# Patient Record
Sex: MALE | Race: WHITE | NOT HISPANIC OR LATINO | Employment: OTHER | URBAN - METROPOLITAN AREA
[De-identification: names, ages, dates, MRNs, and addresses within clinical notes are randomized per-mention and may not be internally consistent; named-entity substitution may affect disease eponyms.]

---

## 2017-03-27 ENCOUNTER — TELEPHONE (OUTPATIENT)
Dept: UROLOGY | Facility: CLINIC | Age: 82
End: 2017-03-27

## 2017-03-27 NOTE — TELEPHONE ENCOUNTER
----- Message from Serena Davidson sent at 3/24/2017 11:58 AM CDT -----  Artee with the [facility] called.   No. [phone]  Ext. [phone]   New patient has an elevated PSA.  She would like to make an appointment on 3/31/17 around 1:00.   She is coming to the office on that date and time with another patient.   Please call.

## 2017-03-31 ENCOUNTER — OFFICE VISIT (OUTPATIENT)
Dept: UROLOGY | Facility: CLINIC | Age: 82
End: 2017-03-31
Payer: MEDICARE

## 2017-03-31 VITALS
DIASTOLIC BLOOD PRESSURE: 80 MMHG | BODY MASS INDEX: 24.44 KG/M2 | TEMPERATURE: 98 F | SYSTOLIC BLOOD PRESSURE: 130 MMHG | HEART RATE: 100 BPM | WEIGHT: 184.44 LBS | RESPIRATION RATE: 20 BRPM | HEIGHT: 73 IN

## 2017-03-31 DIAGNOSIS — R97.20 ELEVATED PSA, LESS THAN 10 NG/ML: Primary | ICD-10-CM

## 2017-03-31 DIAGNOSIS — N40.1 BENIGN NON-NODULAR PROSTATIC HYPERPLASIA WITH LOWER URINARY TRACT SYMPTOMS: ICD-10-CM

## 2017-03-31 DIAGNOSIS — R35.0 URINARY FREQUENCY: ICD-10-CM

## 2017-03-31 DIAGNOSIS — R35.1 NOCTURIA: ICD-10-CM

## 2017-03-31 DIAGNOSIS — G20.A1 PARKINSON DISEASE: Chronic | ICD-10-CM

## 2017-03-31 PROBLEM — N40.0 BPH WITHOUT OBSTRUCTION/LOWER URINARY TRACT SYMPTOMS: Status: ACTIVE | Noted: 2017-03-31

## 2017-03-31 PROCEDURE — 99499 UNLISTED E&M SERVICE: CPT | Mod: S$GLB,,, | Performed by: UROLOGY

## 2017-03-31 PROCEDURE — 1126F AMNT PAIN NOTED NONE PRSNT: CPT | Mod: S$GLB,,, | Performed by: UROLOGY

## 2017-03-31 PROCEDURE — 1157F ADVNC CARE PLAN IN RCRD: CPT | Mod: S$GLB,,, | Performed by: UROLOGY

## 2017-03-31 PROCEDURE — 1160F RVW MEDS BY RX/DR IN RCRD: CPT | Mod: S$GLB,,, | Performed by: UROLOGY

## 2017-03-31 PROCEDURE — 1159F MED LIST DOCD IN RCRD: CPT | Mod: S$GLB,,, | Performed by: UROLOGY

## 2017-03-31 PROCEDURE — 99999 PR PBB SHADOW E&M-EST. PATIENT-LVL III: CPT | Mod: PBBFAC,,, | Performed by: UROLOGY

## 2017-03-31 PROCEDURE — 99204 OFFICE O/P NEW MOD 45 MIN: CPT | Mod: S$GLB,,, | Performed by: UROLOGY

## 2017-03-31 RX ORDER — CLOPIDOGREL BISULFATE 75 MG/1
75 TABLET ORAL DAILY
COMMUNITY

## 2017-03-31 RX ORDER — DOCUSATE SODIUM 100 MG/1
100 CAPSULE, LIQUID FILLED ORAL NIGHTLY PRN
COMMUNITY

## 2017-03-31 RX ORDER — METOPROLOL TARTRATE 25 MG/1
75 TABLET, FILM COATED ORAL 2 TIMES DAILY
COMMUNITY

## 2017-03-31 RX ORDER — RISPERIDONE 0.5 MG/1
0.5 TABLET ORAL 2 TIMES DAILY
COMMUNITY

## 2017-03-31 RX ORDER — LORAZEPAM 0.5 MG/1
0.5 TABLET ORAL EVERY 6 HOURS PRN
COMMUNITY
End: 2017-07-25

## 2017-03-31 RX ORDER — CARBIDOPA AND LEVODOPA 50; 200 MG/1; MG/1
1 TABLET, EXTENDED RELEASE ORAL 3 TIMES DAILY
COMMUNITY

## 2017-03-31 NOTE — PROGRESS NOTES
Subjective:       Patient ID: Eliecer Dickson is a 89 y.o. male.    Chief Complaint: Elevated PSA    HPI Comments: 89 year old WM from [facility] in [place], [place]. Pt developed an elevation of PSA (6.8). BPH with LUTS.    Benign Prostatic Hypertrophy   This is a chronic problem. The current episode started more than 1 year ago. The problem has been gradually improving since onset. Irritative symptoms include frequency (q 2 hr) and nocturia ( x 3). Irritative symptoms do not include urgency. Obstructive symptoms do not include dribbling, incomplete emptying, an intermittent stream, a slower stream, straining or a weak stream. Pertinent negatives include no chills, dysuria, genital pain, hematuria, hesitancy, nausea or vomiting. AUA score is 0-7. He is not sexually active. The symptoms are aggravated by caffeine. Past treatments include finasteride. The treatment provided moderate relief. He has been using treatment for 2 or more years.     Review of Systems   Constitutional: Negative for activity change, appetite change, chills, diaphoresis, fatigue, fever and unexpected weight change.   HENT: Negative for congestion, hearing loss, sinus pressure and trouble swallowing.    Eyes: Negative for photophobia, pain, discharge and visual disturbance.   Respiratory: Negative for apnea, cough and shortness of breath.    Cardiovascular: Negative for chest pain, palpitations and leg swelling.   Gastrointestinal: Negative for abdominal distention, abdominal pain, anal bleeding, blood in stool, constipation, diarrhea, nausea, rectal pain and vomiting.   Endocrine: Negative for cold intolerance, heat intolerance, polydipsia, polyphagia and polyuria.   Genitourinary: Positive for frequency (q 2 hr) and nocturia ( x 3). Negative for decreased urine volume, difficulty urinating, discharge, dysuria, enuresis, flank pain, genital sores, hematuria, hesitancy, incomplete emptying, penile pain, penile swelling, scrotal swelling, testicular pain  and urgency.   Musculoskeletal: Negative for arthralgias, back pain and myalgias.   Skin: Negative for color change, pallor, rash and wound.   Allergic/Immunologic: Negative for environmental allergies, food allergies and immunocompromised state.   Neurological: Negative for dizziness, seizures, weakness and headaches.   Hematological: Negative for adenopathy. Does not bruise/bleed easily.   Psychiatric/Behavioral: Negative.        Objective:      Physical Exam   Nursing note and vitals reviewed.  Constitutional: He is oriented to person, place, and time. He appears well-developed and well-nourished.   HENT:   Head: Normocephalic.   Nose: Nose normal.   Mouth/Throat: Oropharynx is clear and moist.   Eyes: Conjunctivae and EOM are normal. Pupils are equal, round, and reactive to light.   Neck: Normal range of motion. Neck supple.   Cardiovascular: Normal rate, regular rhythm, normal heart sounds and intact distal pulses.    Pulmonary/Chest: Effort normal and breath sounds normal.   Abdominal: Soft. Bowel sounds are normal.   Genitourinary: Rectum normal, testes normal and penis normal. Prostate is enlarged (~ 50 grams, benign). Prostate is not tender. Cremasteric reflex is present. Circumcised.   Musculoskeletal: Normal range of motion.   Neurological: He is alert and oriented to person, place, and time. He has normal reflexes.   Skin: Skin is warm and dry.     Psychiatric: He has a normal mood and affect. His behavior is normal. Judgment and thought content normal.       Assessment:       1. Parkinson disease    2. Benign non-nodular prostatic hyperplasia with lower urinary tract symptoms    3. Nocturia    4. Urinary frequency        Plan:       Patient Instructions   Check Free and total PSA now.  If elevated will repeat in 6 months.  If PSA stays stable may be normal for this patient based on patients age and size of prostate.

## 2017-03-31 NOTE — MR AVS SNAPSHOT
[facility]  [address] [place] [address] [address]  [place] [address]  Phone: [phone]  Fax: [phone]                  Eliecer Dickson   3/31/2017 11:30 AM   Office Visit    Description:  Male : 1926   Provider:  Murray Perry MD   Department:  [facility]           Reason for Visit     Elevated PSA           Diagnoses this Visit        Comments    Elevated PSA, less than 10 ng/ml    -  Primary     Parkinson disease         Benign non-nodular prostatic hyperplasia with lower urinary tract symptoms         Nocturia         Urinary frequency                To Do List           Future Appointments        Provider Department Dept Phone    2017 10:30 AM Murray Perry MD [facility] [phone]      Goals (5 Years of Data)     None      Follow-Up and Disposition     Return in about 6 months (around 2017) for PSA and MAXIMINO.    Follow-up and Disposition History      [facility]     [facility] Nurse Care Line -  Assistance  Unless otherwise directed by your provider, please contact [facility], our nurse care line that is available for  assistance.     Registered nurses in the [facility] provide: appointment scheduling, clinical advisement, health education, and other advisory services.  Call: [phone] (toll free)               Medications           Message regarding Medications     Verify the changes and/or additions to your medication regime listed below are the same as discussed with your clinician today.  If any of these changes or additions are incorrect, please notify your healthcare provider.        STOP taking these medications     carbidopa-levodopa  mg (SINEMET)  mg per tablet Take 2 tablets by mouth 3 (three) times daily. Pt takes 50mg three times daily    CALCIUM CARB/VIT D3/MINERALS (CALTRATE PLUS ORAL) Take 1 tablet by mouth 2 (two) times daily.    loperamide (IMODIUM) 2 mg capsule Take 4 mg by mouth 2 (two)  times daily as needed.    MULTIVIT-IRON-MIN-FOLIC ACID 3,500-18-0.4 UNIT-MG-MG ORAL CHEW Take 1 tablet by mouth once daily.    phenylephrine-DM-guaifenesin (ROBITUSSIN COUGH & COLD CF) 2.5-5-50 mg/5 mL Liqd Take 30 mLs by mouth 2 (two) times daily.     promethazine (PHENERGAN) 25 MG suppository Place 25 mg rectally every 6 (six) hours as needed.    temazepam (RESTORIL) 30 mg capsule Take 30 mg by mouth nightly as needed.           Verify that the below list of medications is an accurate representation of the medications you are currently taking.  If none reported, the list may be blank. If incorrect, please contact your healthcare provider. Carry this list with you in case of emergency.           Current Medications     alprazolam (XANAX) 0.25 MG tablet Take 0.25 mg by mouth 4 (four) times daily as needed.    aspirin 81 MG Chew Take 81 mg by mouth once daily.    carbidopa-levodopa  mg (SINEMET CR)  mg TbSR Take 1 tablet by mouth 3 (three) times daily.    clopidogrel (PLAVIX) 75 mg tablet Take 75 mg by mouth once daily.    D-METHORPHAN/PE/ACETAMINOPHEN (ROBITUSSIN COLD-FLU DAY ORAL) Take by mouth.    docusate sodium (COLACE) 100 MG capsule Take 100 mg by mouth nightly as needed for Constipation.    donepezil (ARICEPT) 10 MG tablet Take 10 mg by mouth every evening.    finasteride (PROSCAR) 5 mg tablet Take 5 mg by mouth once daily.    FOLIC ACID/MULTIVITS-MIN/LUT (CENTRUM SILVER ORAL) Take by mouth.    furosemide (LASIX) 40 MG tablet Take 1 tablet (40 mg total) by mouth 2 (two) times daily.    isosorbide mononitrate (IMDUR) 30 MG 24 hr tablet Take 1 tablet (30 mg total) by mouth once daily.    lactase (LACTAID) 3,000 unit tablet Take 2 tablets by mouth 2 (two) times daily with meals.    levothyroxine (SYNTHROID) 125 MCG tablet Take 125 mcg by mouth once daily.    loratadine (CLARITIN) 10 mg tablet Take 10 mg by mouth once daily.    lorazepam (ATIVAN) 0.5 MG tablet Take 0.5 mg by mouth every 6 (six) hours  "as needed for Anxiety.    losartan (COZAAR) 50 MG tablet Take 1 tablet (50 mg total) by mouth once daily.    metoprolol tartrate (LOPRESSOR) 25 MG tablet Take 25 mg by mouth 2 (two) times daily. Take 3 tablets for a total of 75 mg twice a day.    potassium chloride SA (K-DUR,KLOR-CON) 20 MEQ tablet Take 20 mEq by mouth once daily.    quetiapine (SEROQUEL) 25 MG Tab Take by mouth.    quetiapine (SEROQUEL) 50 MG tablet Take 50 mg by mouth every evening.    ranitidine (ZANTAC) 150 MG tablet Take 150 mg by mouth 2 (two) times daily.    risperidone (RISPERDAL) 0.5 MG Tab Take 0.5 mg by mouth 2 (two) times daily.    senna-docusate 8.6-50 mg (SENNA WITH DOCUSATE SODIUM) 8.6-50 mg per tablet Take 2 tablets by mouth nightly as needed.    simvastatin (ZOCOR) 40 MG tablet Take 40 mg by mouth every evening.    tamsulosin (FLOMAX) 0.4 mg Cp24 Take 0.4 mg by mouth once daily.    tramadol (ULTRAM) 50 mg tablet Take 50 mg by mouth 2 (two) times daily.    carbidopa (LODOSYN) 25 mg tablet Take 25 mg by mouth 3 (three) times daily. Pt not taking, not on list           Clinical Reference Information           Your Vitals Were     BP Pulse Temp Resp Height Weight    130/80 100 98.3 °F (36.8 °C) 20 6' 1" (1.854 m) 83.7 kg (184 lb 6.6 oz)    BMI                24.33 kg/m2          Blood Pressure          Most Recent Value    BP  130/80      Allergies as of 3/31/2017     No Known Allergies      Immunizations Administered on Date of Encounter - 3/31/2017     None      Orders Placed During Today's Visit     Future Labs/Procedures Expected by Expires    PSA, total and free  3/31/2017 5/30/2018      [facility] Sign-Up     Activating your [facility] account is as easy as 1-2-3!     1) Visit [email or web address], select Sign Up Now, enter this activation code and your date of birth, then select Next.  [email or web address]  Expires: 5/15/2017  3:00 PM      2) Create a username and password to use when you visit [facility] in the future and select a security " question in case you lose your password and select Next.    3) Enter your e-mail address and click Sign Up!    Additional Information  If you have questions, please e-mail [email or web address] or call [phone] to talk to our [facility] staff. Remember, [facility] is NOT to be used for urgent needs. For medical emergencies, dial 911.         Instructions    Check Free and total PSA now.  If elevated will repeat in 6 months.  If PSA stays stable may be normal for this patient based on patients age and size of prostate.       Language Assistance Services     ATTENTION: Language assistance services are available, free of charge. Please call [phone].      ATENCIÓN: Si ron linares, tiene a parkinson disposición servicios gratuitos de asistencia lingüística. Llame al [phone].     CHÚ Ý: N?u b?n nói Ti?ng Vi?t, có các d?ch v? h? tr? ngôn ng? mi?n phí dành cho b?n. G?i s? [phone].         [facility] complies with applicable Federal civil rights laws and does not discriminate on the basis of race, color, national origin, age, disability, or sex.

## 2017-04-19 PROCEDURE — 99308 SBSQ NF CARE LOW MDM 20: CPT | Mod: ,,, | Performed by: FAMILY MEDICINE

## 2017-05-11 ENCOUNTER — OUTSIDE PLACE OF SERVICE (OUTPATIENT)
Dept: ADMINISTRATIVE | Facility: OTHER | Age: 82
End: 2017-05-11
Payer: MEDICARE

## 2017-05-18 DIAGNOSIS — Z00.00 ENCOUNTER FOR SCREENING AND PREVENTATIVE CARE: Primary | ICD-10-CM

## 2017-05-19 ENCOUNTER — HOSPITAL ENCOUNTER (OUTPATIENT)
Dept: RADIOLOGY | Facility: HOSPITAL | Age: 82
Discharge: HOME | End: 2017-05-19
Attending: FAMILY MEDICINE
Payer: MEDICARE

## 2017-05-19 DIAGNOSIS — Z00.00 ENCOUNTER FOR SCREENING AND PREVENTATIVE CARE: ICD-10-CM

## 2017-05-19 PROCEDURE — 71020 XR CHEST PA AND LATERAL: CPT | Mod: TC,PO

## 2017-05-24 ENCOUNTER — OUTSIDE PLACE OF SERVICE (OUTPATIENT)
Dept: ADMINISTRATIVE | Facility: OTHER | Age: 82
End: 2017-05-24
Payer: MEDICARE

## 2017-05-24 ENCOUNTER — OUTSIDE PLACE OF SERVICE (OUTPATIENT)
Dept: ADMINISTRATIVE | Facility: OTHER | Age: 82
End: 2017-05-24

## 2017-05-24 DIAGNOSIS — R21 RASH: ICD-10-CM

## 2017-05-24 PROCEDURE — 99499 UNLISTED E&M SERVICE: CPT | Mod: ,,, | Performed by: FAMILY MEDICINE

## 2017-06-07 ENCOUNTER — OUTSIDE PLACE OF SERVICE (OUTPATIENT)
Dept: ADMINISTRATIVE | Facility: OTHER | Age: 82
End: 2017-06-07
Payer: MEDICARE

## 2017-06-07 PROCEDURE — 99307 SBSQ NF CARE SF MDM 10: CPT | Mod: ,,, | Performed by: FAMILY MEDICINE

## 2017-07-13 ENCOUNTER — OUTSIDE PLACE OF SERVICE (OUTPATIENT)
Dept: ADMINISTRATIVE | Facility: OTHER | Age: 82
End: 2017-07-13

## 2017-07-19 ENCOUNTER — OUTSIDE PLACE OF SERVICE (OUTPATIENT)
Dept: ADMINISTRATIVE | Facility: OTHER | Age: 82
End: 2017-07-19

## 2017-07-25 ENCOUNTER — HOSPITAL ENCOUNTER (INPATIENT)
Facility: HOSPITAL | Age: 82
LOS: 2 days | Discharge: NURSING, REHAB OR LONG-TERM CARE | DRG: 280 | End: 2017-07-27
Attending: EMERGENCY MEDICINE | Admitting: HOSPITALIST
Payer: MEDICARE

## 2017-07-25 DIAGNOSIS — I50.32 CHRONIC DIASTOLIC CONGESTIVE HEART FAILURE: Chronic | ICD-10-CM

## 2017-07-25 DIAGNOSIS — I45.10 RIGHT BUNDLE BRANCH BLOCK: ICD-10-CM

## 2017-07-25 DIAGNOSIS — I50.33 ACUTE ON CHRONIC DIASTOLIC CONGESTIVE HEART FAILURE: ICD-10-CM

## 2017-07-25 DIAGNOSIS — I21.4 NSTEMI, INITIAL EPISODE OF CARE: ICD-10-CM

## 2017-07-25 DIAGNOSIS — M79.89 LEG SWELLING: ICD-10-CM

## 2017-07-25 DIAGNOSIS — G20.A1 PARKINSON DISEASE: Chronic | ICD-10-CM

## 2017-07-25 DIAGNOSIS — I50.33 ACUTE ON CHRONIC DIASTOLIC HEART FAILURE: ICD-10-CM

## 2017-07-25 DIAGNOSIS — N17.9 ACUTE RENAL FAILURE, UNSPECIFIED ACUTE RENAL FAILURE TYPE: Primary | ICD-10-CM

## 2017-07-25 DIAGNOSIS — I50.20 SYSTOLIC CONGESTIVE HEART FAILURE: ICD-10-CM

## 2017-07-25 DIAGNOSIS — E87.70 HYPERVOLEMIA, UNSPECIFIED HYPERVOLEMIA TYPE: ICD-10-CM

## 2017-07-25 PROBLEM — J96.01 ACUTE HYPOXEMIC RESPIRATORY FAILURE: Status: ACTIVE | Noted: 2017-07-25

## 2017-07-25 PROBLEM — N40.1 BENIGN NON-NODULAR PROSTATIC HYPERPLASIA WITH LOWER URINARY TRACT SYMPTOMS: Chronic | Status: ACTIVE | Noted: 2017-03-31

## 2017-07-25 PROBLEM — N40.0 BPH WITHOUT OBSTRUCTION/LOWER URINARY TRACT SYMPTOMS: Status: RESOLVED | Noted: 2017-03-31 | Resolved: 2017-07-25

## 2017-07-25 LAB
ALBUMIN SERPL BCP-MCNC: 2.8 G/DL
ALP SERPL-CCNC: 69 U/L
ALT SERPL W/O P-5'-P-CCNC: <5 U/L
ANION GAP SERPL CALC-SCNC: 9 MMOL/L
AORTIC VALVE REGURGITATION: ABNORMAL
AORTIC VALVE STENOSIS: ABNORMAL
AST SERPL-CCNC: 9 U/L
BASOPHILS # BLD AUTO: 0.01 K/UL
BASOPHILS NFR BLD: 0.1 %
BILIRUB SERPL-MCNC: 0.5 MG/DL
BNP SERPL-MCNC: 1621 PG/ML
BUN SERPL-MCNC: 40 MG/DL
CALCIUM SERPL-MCNC: 8.2 MG/DL
CHLORIDE SERPL-SCNC: 97 MMOL/L
CO2 SERPL-SCNC: 35 MMOL/L
CREAT SERPL-MCNC: 3.1 MG/DL
DIASTOLIC DYSFUNCTION: YES
DIFFERENTIAL METHOD: ABNORMAL
EOSINOPHIL # BLD AUTO: 0.3 K/UL
EOSINOPHIL NFR BLD: 3.1 %
ERYTHROCYTE [DISTWIDTH] IN BLOOD BY AUTOMATED COUNT: 17.5 %
EST. GFR  (AFRICAN AMERICAN): 19 ML/MIN/1.73 M^2
EST. GFR  (NON AFRICAN AMERICAN): 17 ML/MIN/1.73 M^2
ESTIMATED PA SYSTOLIC PRESSURE: 54.44
GLUCOSE SERPL-MCNC: 90 MG/DL
HCT VFR BLD AUTO: 34.1 %
HGB BLD-MCNC: 10.3 G/DL
INR PPP: 1.1
LYMPHOCYTES # BLD AUTO: 2.1 K/UL
LYMPHOCYTES NFR BLD: 25.4 %
MCH RBC QN AUTO: 27 PG
MCHC RBC AUTO-ENTMCNC: 30.2 G/DL
MCV RBC AUTO: 89 FL
MITRAL VALVE MOBILITY: NORMAL
MITRAL VALVE REGURGITATION: ABNORMAL
MONOCYTES # BLD AUTO: 1.1 K/UL
MONOCYTES NFR BLD: 13.3 %
NEUTROPHILS # BLD AUTO: 4.9 K/UL
NEUTROPHILS NFR BLD: 57.7 %
OB PNL STL: NEGATIVE
PLATELET # BLD AUTO: 156 K/UL
PMV BLD AUTO: 10.4 FL
POTASSIUM SERPL-SCNC: 3.6 MMOL/L
PROT SERPL-MCNC: 6 G/DL
PROTHROMBIN TIME: 12 SEC
RBC # BLD AUTO: 3.82 M/UL
RETIRED EF AND QEF - SEE NOTES: 55 (ref 55–65)
SODIUM SERPL-SCNC: 141 MMOL/L
TRICUSPID VALVE REGURGITATION: ABNORMAL
TROPONIN I SERPL DL<=0.01 NG/ML-MCNC: 0.29 NG/ML
TROPONIN I SERPL DL<=0.01 NG/ML-MCNC: 0.36 NG/ML
WBC # BLD AUTO: 8.43 K/UL

## 2017-07-25 PROCEDURE — 85610 PROTHROMBIN TIME: CPT

## 2017-07-25 PROCEDURE — 93306 TTE W/DOPPLER COMPLETE: CPT

## 2017-07-25 PROCEDURE — 85025 COMPLETE CBC W/AUTO DIFF WBC: CPT

## 2017-07-25 PROCEDURE — 82272 OCCULT BLD FECES 1-3 TESTS: CPT

## 2017-07-25 PROCEDURE — 25000003 PHARM REV CODE 250: Performed by: EMERGENCY MEDICINE

## 2017-07-25 PROCEDURE — 96374 THER/PROPH/DIAG INJ IV PUSH: CPT

## 2017-07-25 PROCEDURE — 93010 ELECTROCARDIOGRAM REPORT: CPT | Mod: ,,, | Performed by: INTERNAL MEDICINE

## 2017-07-25 PROCEDURE — 25000003 PHARM REV CODE 250: Performed by: HOSPITALIST

## 2017-07-25 PROCEDURE — 63600175 PHARM REV CODE 636 W HCPCS: Performed by: EMERGENCY MEDICINE

## 2017-07-25 PROCEDURE — 93306 TTE W/DOPPLER COMPLETE: CPT | Mod: 26,,, | Performed by: INTERNAL MEDICINE

## 2017-07-25 PROCEDURE — 94761 N-INVAS EAR/PLS OXIMETRY MLT: CPT

## 2017-07-25 PROCEDURE — 11000001 HC ACUTE MED/SURG PRIVATE ROOM

## 2017-07-25 PROCEDURE — 63600175 PHARM REV CODE 636 W HCPCS: Performed by: HOSPITALIST

## 2017-07-25 PROCEDURE — 25000242 PHARM REV CODE 250 ALT 637 W/ HCPCS: Performed by: EMERGENCY MEDICINE

## 2017-07-25 PROCEDURE — 99285 EMERGENCY DEPT VISIT HI MDM: CPT | Mod: 25

## 2017-07-25 PROCEDURE — 84484 ASSAY OF TROPONIN QUANT: CPT

## 2017-07-25 PROCEDURE — 36415 COLL VENOUS BLD VENIPUNCTURE: CPT

## 2017-07-25 PROCEDURE — 94640 AIRWAY INHALATION TREATMENT: CPT

## 2017-07-25 PROCEDURE — 80053 COMPREHEN METABOLIC PANEL: CPT

## 2017-07-25 PROCEDURE — 83880 ASSAY OF NATRIURETIC PEPTIDE: CPT

## 2017-07-25 PROCEDURE — 93005 ELECTROCARDIOGRAM TRACING: CPT

## 2017-07-25 PROCEDURE — 84484 ASSAY OF TROPONIN QUANT: CPT | Mod: 91

## 2017-07-25 RX ORDER — FAMOTIDINE 20 MG/1
20 TABLET, FILM COATED ORAL DAILY
Status: DISCONTINUED | OUTPATIENT
Start: 2017-07-25 | End: 2017-07-27 | Stop reason: HOSPADM

## 2017-07-25 RX ORDER — LORAZEPAM 0.5 MG/1
0.5 TABLET ORAL EVERY 6 HOURS PRN
COMMUNITY

## 2017-07-25 RX ORDER — ASPIRIN 325 MG
325 TABLET, DELAYED RELEASE (ENTERIC COATED) ORAL
Status: COMPLETED | OUTPATIENT
Start: 2017-07-25 | End: 2017-07-25

## 2017-07-25 RX ORDER — CLOPIDOGREL BISULFATE 75 MG/1
75 TABLET ORAL DAILY
Status: DISCONTINUED | OUTPATIENT
Start: 2017-07-26 | End: 2017-07-27 | Stop reason: HOSPADM

## 2017-07-25 RX ORDER — FLUTICASONE PROPIONATE 50 MCG
1 SPRAY, SUSPENSION (ML) NASAL DAILY
COMMUNITY

## 2017-07-25 RX ORDER — QUETIAPINE FUMARATE 25 MG/1
50 TABLET, FILM COATED ORAL NIGHTLY
Status: DISCONTINUED | OUTPATIENT
Start: 2017-07-25 | End: 2017-07-27 | Stop reason: HOSPADM

## 2017-07-25 RX ORDER — DOCUSATE SODIUM 100 MG/1
100 CAPSULE, LIQUID FILLED ORAL NIGHTLY PRN
Status: DISCONTINUED | OUTPATIENT
Start: 2017-07-25 | End: 2017-07-27 | Stop reason: HOSPADM

## 2017-07-25 RX ORDER — LOSARTAN POTASSIUM 50 MG/1
50 TABLET ORAL DAILY
Status: ON HOLD | COMMUNITY
End: 2017-07-27 | Stop reason: HOSPADM

## 2017-07-25 RX ORDER — FINASTERIDE 5 MG/1
5 TABLET, FILM COATED ORAL DAILY
COMMUNITY

## 2017-07-25 RX ORDER — AMOXICILLIN 500 MG
2 CAPSULE ORAL DAILY
COMMUNITY

## 2017-07-25 RX ORDER — FUROSEMIDE 10 MG/ML
40 INJECTION INTRAMUSCULAR; INTRAVENOUS
Status: COMPLETED | OUTPATIENT
Start: 2017-07-25 | End: 2017-07-25

## 2017-07-25 RX ORDER — TAMSULOSIN HYDROCHLORIDE 0.4 MG/1
0.4 CAPSULE ORAL DAILY
Status: DISCONTINUED | OUTPATIENT
Start: 2017-07-26 | End: 2017-07-27 | Stop reason: HOSPADM

## 2017-07-25 RX ORDER — SIMVASTATIN 40 MG/1
40 TABLET, FILM COATED ORAL NIGHTLY
Status: DISCONTINUED | OUTPATIENT
Start: 2017-07-25 | End: 2017-07-27 | Stop reason: HOSPADM

## 2017-07-25 RX ORDER — ACETAMINOPHEN 325 MG/1
650 TABLET ORAL EVERY 6 HOURS PRN
Status: DISCONTINUED | OUTPATIENT
Start: 2017-07-25 | End: 2017-07-27 | Stop reason: HOSPADM

## 2017-07-25 RX ORDER — CARBIDOPA AND LEVODOPA 50; 200 MG/1; MG/1
1 TABLET, EXTENDED RELEASE ORAL 3 TIMES DAILY
Status: DISCONTINUED | OUTPATIENT
Start: 2017-07-25 | End: 2017-07-27 | Stop reason: HOSPADM

## 2017-07-25 RX ORDER — ISOSORBIDE MONONITRATE 30 MG/1
30 TABLET, EXTENDED RELEASE ORAL DAILY
COMMUNITY

## 2017-07-25 RX ORDER — RISPERIDONE 0.5 MG/1
0.5 TABLET ORAL 2 TIMES DAILY
Status: DISCONTINUED | OUTPATIENT
Start: 2017-07-25 | End: 2017-07-27 | Stop reason: HOSPADM

## 2017-07-25 RX ORDER — HEPARIN SODIUM 5000 [USP'U]/ML
7500 INJECTION, SOLUTION INTRAVENOUS; SUBCUTANEOUS EVERY 8 HOURS
Status: DISCONTINUED | OUTPATIENT
Start: 2017-07-25 | End: 2017-07-27 | Stop reason: HOSPADM

## 2017-07-25 RX ORDER — FINASTERIDE 5 MG/1
5 TABLET, FILM COATED ORAL DAILY
Status: DISCONTINUED | OUTPATIENT
Start: 2017-07-26 | End: 2017-07-27 | Stop reason: HOSPADM

## 2017-07-25 RX ORDER — ONDANSETRON 8 MG/1
8 TABLET, ORALLY DISINTEGRATING ORAL EVERY 8 HOURS PRN
Status: DISCONTINUED | OUTPATIENT
Start: 2017-07-25 | End: 2017-07-27 | Stop reason: HOSPADM

## 2017-07-25 RX ORDER — LORAZEPAM 0.5 MG/1
0.5 TABLET ORAL EVERY 6 HOURS PRN
Status: DISCONTINUED | OUTPATIENT
Start: 2017-07-25 | End: 2017-07-27 | Stop reason: HOSPADM

## 2017-07-25 RX ORDER — IPRATROPIUM BROMIDE AND ALBUTEROL SULFATE 2.5; .5 MG/3ML; MG/3ML
3 SOLUTION RESPIRATORY (INHALATION)
Status: COMPLETED | OUTPATIENT
Start: 2017-07-25 | End: 2017-07-25

## 2017-07-25 RX ORDER — FAMOTIDINE 20 MG/1
20 TABLET, FILM COATED ORAL 2 TIMES DAILY
Status: DISCONTINUED | OUTPATIENT
Start: 2017-07-25 | End: 2017-07-25 | Stop reason: DRUGHIGH

## 2017-07-25 RX ORDER — FUROSEMIDE 10 MG/ML
80 INJECTION INTRAMUSCULAR; INTRAVENOUS 2 TIMES DAILY
Status: DISCONTINUED | OUTPATIENT
Start: 2017-07-25 | End: 2017-07-27 | Stop reason: HOSPADM

## 2017-07-25 RX ORDER — DONEPEZIL HYDROCHLORIDE 5 MG/1
10 TABLET, FILM COATED ORAL NIGHTLY
Status: DISCONTINUED | OUTPATIENT
Start: 2017-07-25 | End: 2017-07-27 | Stop reason: HOSPADM

## 2017-07-25 RX ORDER — LEVOTHYROXINE SODIUM 125 UG/1
125 TABLET ORAL
Status: DISCONTINUED | OUTPATIENT
Start: 2017-07-26 | End: 2017-07-27 | Stop reason: HOSPADM

## 2017-07-25 RX ADMIN — ASPIRIN 325 MG: 325 TABLET, DELAYED RELEASE ORAL at 01:07

## 2017-07-25 RX ADMIN — SIMVASTATIN 40 MG: 40 TABLET, FILM COATED ORAL at 09:07

## 2017-07-25 RX ADMIN — QUETIAPINE FUMARATE 50 MG: 25 TABLET, FILM COATED ORAL at 09:07

## 2017-07-25 RX ADMIN — HEPARIN SODIUM 7500 UNITS: 5000 INJECTION, SOLUTION INTRAVENOUS; SUBCUTANEOUS at 09:07

## 2017-07-25 RX ADMIN — IPRATROPIUM BROMIDE AND ALBUTEROL SULFATE 3 ML: .5; 3 SOLUTION RESPIRATORY (INHALATION) at 12:07

## 2017-07-25 RX ADMIN — RISPERIDONE 0.5 MG: 0.5 TABLET ORAL at 09:07

## 2017-07-25 RX ADMIN — DONEPEZIL HYDROCHLORIDE 10 MG: 5 TABLET, FILM COATED ORAL at 09:07

## 2017-07-25 RX ADMIN — FAMOTIDINE 20 MG: 20 TABLET, FILM COATED ORAL at 09:07

## 2017-07-25 RX ADMIN — FUROSEMIDE 40 MG: 10 INJECTION, SOLUTION INTRAMUSCULAR; INTRAVENOUS at 02:07

## 2017-07-25 RX ADMIN — FUROSEMIDE 80 MG: 10 INJECTION, SOLUTION INTRAMUSCULAR; INTRAVENOUS at 06:07

## 2017-07-25 RX ADMIN — CARBIDOPA AND LEVODOPA 1 TABLET: 50; 200 TABLET, EXTENDED RELEASE ORAL at 09:07

## 2017-07-25 NOTE — ASSESSMENT & PLAN NOTE
Continue aspirin 81 mg daily, clopidrogel 75 mg daily, simvastatin 40 mg HS.  Hold isosorbide mononitrate 30 mg daily.

## 2017-07-25 NOTE — HPI
Eliecer Dickson is a 89 y.o. paranoid schizophrenia, coronary artery disease, severe mitral regurgitation, aortic stenosis, heart failure with preserved ejection fraction, right bundle branch block, hypothyroidism, Parkinson disease, history of left hip fracture status post repair, benign prostatic hyperplasia, hypothyroidism, osteoarthritis, and lactose intolerance.  He lives in [facility] in [place], [place].  He has a fluid restriction (dietary gives 1260 mL, nursing gives 240 mL).  He is full code.  He was in the [employer] and [place] II.  His primary care physician is Dr. To Izquierdo.    He was sent to [facility] ED on 7/25/17 after the nurse practitioner felt he was having a heart failure exacerbation.  He had bilateral pitting edema, cough, and orthopnea.  He typically takes furosemide 40 mg daily.  In the ED he was found to have hypoxia, tachypnea, relative hypotension (90s/40s), elevated troponin (0.359), evidence of heart failure exacerbation with elevated BNP (1621), prominent pulmonary vasculature and perihilar ground glass opacities on chest x-ray, and acute kidney injury (BUN 40, creatinine 3.1).  He was admitted to [facility].

## 2017-07-25 NOTE — ED PROVIDER NOTES
Encounter Date: 7/25/2017       History     Chief Complaint   Patient presents with    Shortness of Breath     Nursing home reports that patient seemed SOB. On arrival, patient with no complaints and in no distress.      89-year-old male presents to the emergency Department by EMS with reports from the nursing home that he appeared short of breath when laying flat.  Patient denies complaints, though has history of schizophrenia.  He has a history of diastolic heart failure, pulmonary hypertension, as well as coronary artery disease.  He wears 3 L of oxygen at baseline.  He is satting 100% on his baseline 3 L.  Patient denies complaints.  He has lower extremity edema with wraps on.          Review of patient's allergies indicates:  No Known Allergies  Past Medical History:   Diagnosis Date    Arthritis     Asthma     BPH without obstruction/lower urinary tract symptoms     Choledocholithiasis with obstruction     Coronary artery disease     Depression     Diastolic CHF     Gait instability     GERD (gastroesophageal reflux disease)     Hypertension     Hypocalcemia     Melanoma     melanoma    Mitral regurgitation     Parkinson disease     Physical deconditioning     Pulmonary hypertension     Right bundle branch block     Thyroid disease      Past Surgical History:   Procedure Laterality Date    CHOLECYSTECTOMY  unsure    HIP FRACTURE SURGERY Left     left broken hip repair    TONSILLECTOMY, ADENOIDECTOMY       History reviewed. No pertinent family history.  Social History   Substance Use Topics    Smoking status: Never Smoker    Smokeless tobacco: Never Used    Alcohol use No     Review of Systems   Eyes: Negative.    Endocrine: Negative.    Allergic/Immunologic: Negative.    All other systems reviewed and are negative.      Physical Exam     Initial Vitals   BP Pulse Resp Temp SpO2   -- -- -- -- --      MAP       --         Physical Exam    Nursing note and vitals  reviewed.  Constitutional: He appears well-developed and well-nourished.   HENT:   Head: Normocephalic.   Eyes: Pupils are equal, round, and reactive to light.   Cardiovascular: Normal heart sounds.   hypotensive   Pulmonary/Chest:   Coarse breath sounds in all lung fields  Unlabored  He is on 3 liters, as baseline   Abdominal: Soft.   Musculoskeletal:   Lower extremities with edema bilaterally, bilateral wrap   Neurological: He is alert and oriented to person, place, and time. He has normal strength.   Skin: Skin is warm.   Blistering lesions of lower extremities   Psychiatric: He has a normal mood and affect. His behavior is normal. Thought content normal.         ED Course   Procedures  Labs Reviewed   CBC W/ AUTO DIFFERENTIAL - Abnormal; Notable for the following:        Result Value    RBC 3.82 (*)     Hemoglobin 10.3 (*)     Hematocrit 34.1 (*)     MCHC 30.2 (*)     RDW 17.5 (*)     Mono # 1.1 (*)     All other components within normal limits   COMPREHENSIVE METABOLIC PANEL - Abnormal; Notable for the following:     CO2 35 (*)     BUN, Bld 40 (*)     Creatinine 3.1 (*)     Calcium 8.2 (*)     Albumin 2.8 (*)     AST 9 (*)     ALT <5 (*)     eGFR if  19 (*)     eGFR if non  17 (*)     All other components within normal limits   TROPONIN I - Abnormal; Notable for the following:     Troponin I 0.359 (*)     All other components within normal limits   B-TYPE NATRIURETIC PEPTIDE - Abnormal; Notable for the following:     BNP 1,621 (*)     All other components within normal limits   PROTIME-INR   OCCULT BLOOD X 1, STOOL     EKG Readings: (Independently Interpreted)   Initial Reading: No STEMI.   NSR with RBB with Q waves anteriorly          Medical Decision Making:   Initial Assessment:   89-year-old male presents emergency Department with report by EMS providers of shortness of breath, history of diastolic heart failure  Differential Diagnosis:   Volume overload with pulmonary edema,  ACS, pneumonia, heart failure, bronchospasm  Clinical Tests:   Lab Tests: Ordered and Reviewed  Radiological Study: Ordered and Reviewed  Medical Tests: Ordered and Reviewed  ED Management:  Patient is noted to have a bump in his renal dysfunction, and elevated creatinine from baseline.  With an elevation in his troponin and BNP from baseline, this brings to mind a pulmonary embolism.  I suspect this is more likely representative of renal failure volume overload from inability of his kidneys to clear.  He will not tolerate a CT PE protocol given his renal dysfunction.  We'll get bilateral lower extremity duplex.  He will go onto therapeutic heparin if his lower extremity duplexes are positive for blood clot.  In the interim he is given aspirin as well as furosemide.  He will be admitted to the [facility].                   ED Course     Clinical Impression:   The primary encounter diagnosis was Acute renal failure, unspecified acute renal failure type. Diagnoses of Hypervolemia, unspecified hypervolemia type, Acute on chronic diastolic heart failure, Leg swelling, NSTEMI, initial episode of care, Acute on chronic diastolic congestive heart failure, Right bundle branch block, and Systolic congestive heart failure were also pertinent to this visit.                           Amanda Barry MD  07/25/17 0844

## 2017-07-25 NOTE — ED NOTES
"Per EMS, pt to ED because nursing home staff states pt seemed SOB when he was supine, although pt has not been complaining about any SOB. Pt states that inhaling and exhaling are "uncomfortable". Pt has a productive-sounding although he has been unable produce any sputum. Pt coughs occasionally. Pt knows the day of the week, but does not know the month, answering the latter question with "Thursday". Pt has +4 pitting edema bilaterally LE.  "

## 2017-07-25 NOTE — SUBJECTIVE & OBJECTIVE
Past Medical History:   Diagnosis Date    Arthritis     Asthma     BPH without obstruction/lower urinary tract symptoms     Choledocholithiasis with obstruction     Coronary artery disease     Depression     Diastolic CHF     Gait instability     GERD (gastroesophageal reflux disease)     Hypertension     Hypocalcemia     Melanoma     melanoma    Mitral regurgitation     Parkinson disease     Physical deconditioning     Pulmonary hypertension     Thyroid disease        Past Surgical History:   Procedure Laterality Date    CHOLECYSTECTOMY  unsure    HIP FRACTURE SURGERY Left     left broken hip repair    TONSILLECTOMY, ADENOIDECTOMY         Review of patient's allergies indicates:  No Known Allergies    No current facility-administered medications on file prior to encounter.      Current Outpatient Prescriptions on File Prior to Encounter   Medication Sig    alprazolam (XANAX) 0.25 MG tablet Take 0.25 mg by mouth 4 (four) times daily as needed.    aspirin 81 MG Chew Take 81 mg by mouth once daily.    carbidopa-levodopa  mg (SINEMET CR)  mg TbSR Take 1 tablet by mouth 3 (three) times daily.    clopidogrel (PLAVIX) 75 mg tablet Take 75 mg by mouth once daily.    D-METHORPHAN/PE/ACETAMINOPHEN (ROBITUSSIN COLD-FLU DAY ORAL) Take 30 mLs by mouth 2 (two) times daily as needed (cough).     docusate sodium (COLACE) 100 MG capsule Take 100 mg by mouth nightly as needed for Constipation.    donepezil (ARICEPT) 10 MG tablet Take 10 mg by mouth every evening.    FOLIC ACID/MULTIVITS-MIN/LUT (CENTRUM SILVER ORAL) Take by mouth once daily.     furosemide (LASIX) 40 MG tablet Take 1 tablet (40 mg total) by mouth 2 (two) times daily. (Patient taking differently: Take 40 mg by mouth once daily. )    lactase (LACTAID) 3,000 unit tablet Take 2 tablets by mouth 2 (two) times daily with meals.    levothyroxine (SYNTHROID) 125 MCG tablet Take 125 mcg by mouth once daily.    loratadine (CLARITIN)  10 mg tablet Take 10 mg by mouth once daily.    metoprolol tartrate (LOPRESSOR) 25 MG tablet Take 75 mg by mouth 2 (two) times daily. Take 3 tablets for a total of 75 mg twice a day.     potassium chloride SA (K-DUR,KLOR-CON) 20 MEQ tablet Take 20 mEq by mouth once daily.    quetiapine (SEROQUEL) 25 MG Tab Take 12.5 mg by mouth 2 (two) times daily.     quetiapine (SEROQUEL) 50 MG tablet Take 50 mg by mouth every evening.    ranitidine (ZANTAC) 150 MG tablet Take 150 mg by mouth 2 (two) times daily.    risperidone (RISPERDAL) 0.5 MG Tab Take 0.5 mg by mouth 2 (two) times daily.    senna-docusate 8.6-50 mg (SENNA WITH DOCUSATE SODIUM) 8.6-50 mg per tablet Take 2 tablets by mouth nightly as needed.    simvastatin (ZOCOR) 40 MG tablet Take 40 mg by mouth every evening.    tamsulosin (FLOMAX) 0.4 mg Cp24 Take 0.4 mg by mouth once daily.    tramadol (ULTRAM) 50 mg tablet Take 50 mg by mouth 2 (two) times daily.    [DISCONTINUED] carbidopa (LODOSYN) 25 mg tablet Take 25 mg by mouth 3 (three) times daily. Pt not taking, not on list    [DISCONTINUED] finasteride (PROSCAR) 5 mg tablet Take 5 mg by mouth once daily.    [DISCONTINUED] isosorbide mononitrate (IMDUR) 30 MG 24 hr tablet Take 1 tablet (30 mg total) by mouth once daily.    [DISCONTINUED] lorazepam (ATIVAN) 0.5 MG tablet Take 0.5 mg by mouth every 6 (six) hours as needed for Anxiety.    [DISCONTINUED] losartan (COZAAR) 50 MG tablet Take 1 tablet (50 mg total) by mouth once daily.     Family History     None        Social History Main Topics    Smoking status: Never Smoker    Smokeless tobacco: Never Used    Alcohol use No    Drug use: No    Sexual activity: No     Review of Systems   Constitutional: Negative for chills and fever.   HENT: Negative for trouble swallowing and voice change.    Eyes: Negative for pain and redness.   Respiratory: Positive for cough and shortness of breath.    Cardiovascular: Positive for leg swelling. Negative for chest  pain.   Gastrointestinal: Negative for nausea and vomiting.   Genitourinary: Negative for difficulty urinating and dysuria.   Musculoskeletal: Positive for gait problem. Negative for neck pain and neck stiffness.   Skin: Negative for color change and rash.   Neurological: Negative for speech difficulty and headaches.     Objective:     Vital Signs (Most Recent):  Temp: 96.8 °F (36 °C) (07/25/17 1155)  Pulse: 80 (07/25/17 1503)  Resp: 20 (07/25/17 1503)  BP: (!) 108/52 (07/25/17 1449)  SpO2: 100 % (07/25/17 1503) Vital Signs (24h Range):  Temp:  [96.8 °F (36 °C)] 96.8 °F (36 °C)  Pulse:  [73-81] 80  Resp:  [12-25] 20  SpO2:  [91 %-100 %] 100 %  BP: ()/(43-52) 108/52     Weight: 81.6 kg (180 lb)  Body mass index is 29.05 kg/m².    Physical Exam   Constitutional: He is oriented to person, place, and time. He appears well-developed. No distress.   HENT:   Head: Normocephalic.   Mouth/Throat: Oropharynx is clear and moist.   Eyes: Conjunctivae are normal. Pupils are equal, round, and reactive to light.   Neck: Neck supple. No tracheal deviation present.   Cardiovascular: Normal rate and regular rhythm.    Murmur heard.   Systolic murmur is present   Pulmonary/Chest: No respiratory distress. He has rales.   Abdominal: Soft. He exhibits no distension. There is no tenderness.   Musculoskeletal: He exhibits edema.   Legs wrapped   Neurological: He is alert and oriented to person, place, and time.   Skin: Skin is warm and dry.   Psychiatric: He has a normal mood and affect.   Nursing note and vitals reviewed.       Significant Labs: All pertinent labs within the past 24 hours have been reviewed.    Significant Imaging: I have reviewed all pertinent imaging results/findings within the past 24 hours.   EKG 7/25/17: Normal sinus rhythm, right bundle branch block  X-Ray Chest AP Portable 7/25/17: The mediastinal structures are midline.  The cardiac silhouette is difficult to evaluate due to AP technique.  There is  atherosclerotic calcification of the aortic arch.  Ossification projecting over the medial right lung apex are stable and correspond to thyroid calcifications seen on prior CT.  There is prominence of central pulmonary vessels and perihilar groundglass opacities suggestive of edema.  There is osteopenia and degenerative change of the shoulders.  US Lower Extremity Veins Bilateral 7/25/17: The deep veins of the bilateral lower extremity demonstrate normal color flow and compressibility.  No fluid collections identified.  The common femoral veins demonstrates normal phasicity of waveform.  No evidence of significant venous reflux.

## 2017-07-25 NOTE — ED NOTES
Pt has been resting comfortably but is now awake, alert. Pt c/o hunger, but denies shortness of breath, pain, or any other complaints.

## 2017-07-25 NOTE — ASSESSMENT & PLAN NOTE
Acute hypoxemic respiratory failure  Severe mitral regurgitation  Pulmonary hypertension  Takes furosemide 40 mg daily at home.  Give furosemide 80 mg IV BID.  Hold losartan 50 mg daily.  Monitor intake and output.  Monitor renal function and electrolytes.  Follow up echocardiogram results.  Wean oxygen as tolerated.

## 2017-07-25 NOTE — PROGRESS NOTES
Pepcid dose decreased to 20 mg once a day per P&T approved pharmacy protocol.('s guidelines for CrCl =<50 Administer 50% of dose or increase the dosing interval to every 36 to 48 hours to limit potential CNS adverse effects.)

## 2017-07-25 NOTE — ASSESSMENT & PLAN NOTE
Continue quetiapine 12.5 mg BID and 50 mg nightly, risperidone 0.5 mg BID, lorazepam 0.5 mg q6h PRN.  Will hold quetiapine if hypotension continues to be an issue.

## 2017-07-25 NOTE — NURSING
Pt arrival to floor. Wounds noted to sacrum, left lower leg, and right heel. Pt does has some abrasions to right lower leg as well. Pt appeared with bilateral leg wraps on. Wraps removed and reapplied. Mepilex applied to sacrum. Pt is lethargic and difficult to arouse. Pt fails swallow study at this time. Aspiration precautions at bedside.

## 2017-07-26 LAB
ANION GAP SERPL CALC-SCNC: 11 MMOL/L
BUN SERPL-MCNC: 51 MG/DL
CALCIUM SERPL-MCNC: 8.1 MG/DL
CHLORIDE SERPL-SCNC: 97 MMOL/L
CO2 SERPL-SCNC: 33 MMOL/L
CREAT SERPL-MCNC: 3.1 MG/DL
EST. GFR  (AFRICAN AMERICAN): 19 ML/MIN/1.73 M^2
EST. GFR  (NON AFRICAN AMERICAN): 17 ML/MIN/1.73 M^2
GLUCOSE SERPL-MCNC: 110 MG/DL
MAGNESIUM SERPL-MCNC: 2 MG/DL
PHOSPHATE SERPL-MCNC: 6.8 MG/DL
POTASSIUM SERPL-SCNC: 3.8 MMOL/L
SODIUM SERPL-SCNC: 141 MMOL/L
TROPONIN I SERPL DL<=0.01 NG/ML-MCNC: 0.2 NG/ML

## 2017-07-26 PROCEDURE — 25000003 PHARM REV CODE 250: Performed by: HOSPITALIST

## 2017-07-26 PROCEDURE — 97530 THERAPEUTIC ACTIVITIES: CPT

## 2017-07-26 PROCEDURE — 11000001 HC ACUTE MED/SURG PRIVATE ROOM

## 2017-07-26 PROCEDURE — 83735 ASSAY OF MAGNESIUM: CPT

## 2017-07-26 PROCEDURE — 63600175 PHARM REV CODE 636 W HCPCS: Performed by: HOSPITALIST

## 2017-07-26 PROCEDURE — 94761 N-INVAS EAR/PLS OXIMETRY MLT: CPT

## 2017-07-26 PROCEDURE — 97802 MEDICAL NUTRITION INDIV IN: CPT

## 2017-07-26 PROCEDURE — G8987 SELF CARE CURRENT STATUS: HCPCS | Mod: CL

## 2017-07-26 PROCEDURE — 27000221 HC OXYGEN, UP TO 24 HOURS

## 2017-07-26 PROCEDURE — 97161 PT EVAL LOW COMPLEX 20 MIN: CPT

## 2017-07-26 PROCEDURE — 80048 BASIC METABOLIC PNL TOTAL CA: CPT

## 2017-07-26 PROCEDURE — G8989 SELF CARE D/C STATUS: HCPCS | Mod: CL

## 2017-07-26 PROCEDURE — 92610 EVALUATE SWALLOWING FUNCTION: CPT

## 2017-07-26 PROCEDURE — 84100 ASSAY OF PHOSPHORUS: CPT

## 2017-07-26 PROCEDURE — 36415 COLL VENOUS BLD VENIPUNCTURE: CPT

## 2017-07-26 PROCEDURE — G8988 SELF CARE GOAL STATUS: HCPCS | Mod: CL

## 2017-07-26 PROCEDURE — 97165 OT EVAL LOW COMPLEX 30 MIN: CPT

## 2017-07-26 PROCEDURE — G8996 SWALLOW CURRENT STATUS: HCPCS | Mod: CI

## 2017-07-26 PROCEDURE — G8997 SWALLOW GOAL STATUS: HCPCS | Mod: CH

## 2017-07-26 RX ADMIN — CARBIDOPA AND LEVODOPA 1 TABLET: 50; 200 TABLET, EXTENDED RELEASE ORAL at 05:07

## 2017-07-26 RX ADMIN — HEPARIN SODIUM 7500 UNITS: 5000 INJECTION, SOLUTION INTRAVENOUS; SUBCUTANEOUS at 10:07

## 2017-07-26 RX ADMIN — HEPARIN SODIUM 7500 UNITS: 5000 INJECTION, SOLUTION INTRAVENOUS; SUBCUTANEOUS at 03:07

## 2017-07-26 RX ADMIN — DONEPEZIL HYDROCHLORIDE 10 MG: 5 TABLET, FILM COATED ORAL at 10:07

## 2017-07-26 RX ADMIN — CLOPIDOGREL BISULFATE 75 MG: 75 TABLET ORAL at 09:07

## 2017-07-26 RX ADMIN — TAMSULOSIN HYDROCHLORIDE 0.4 MG: 0.4 CAPSULE ORAL at 09:07

## 2017-07-26 RX ADMIN — HEPARIN SODIUM 7500 UNITS: 5000 INJECTION, SOLUTION INTRAVENOUS; SUBCUTANEOUS at 05:07

## 2017-07-26 RX ADMIN — SIMVASTATIN 40 MG: 40 TABLET, FILM COATED ORAL at 10:07

## 2017-07-26 RX ADMIN — CARBIDOPA AND LEVODOPA 1 TABLET: 50; 200 TABLET, EXTENDED RELEASE ORAL at 03:07

## 2017-07-26 RX ADMIN — RISPERIDONE 0.5 MG: 0.5 TABLET ORAL at 09:07

## 2017-07-26 RX ADMIN — QUETIAPINE FUMARATE 12.5 MG: 25 TABLET ORAL at 06:07

## 2017-07-26 RX ADMIN — FINASTERIDE 5 MG: 5 TABLET, FILM COATED ORAL at 09:07

## 2017-07-26 RX ADMIN — QUETIAPINE FUMARATE 50 MG: 25 TABLET, FILM COATED ORAL at 10:07

## 2017-07-26 RX ADMIN — LEVOTHYROXINE SODIUM 125 MCG: 125 TABLET ORAL at 05:07

## 2017-07-26 RX ADMIN — FUROSEMIDE 80 MG: 10 INJECTION, SOLUTION INTRAMUSCULAR; INTRAVENOUS at 05:07

## 2017-07-26 RX ADMIN — RISPERIDONE 0.5 MG: 0.5 TABLET ORAL at 10:07

## 2017-07-26 RX ADMIN — QUETIAPINE FUMARATE 12.5 MG: 25 TABLET ORAL at 05:07

## 2017-07-26 RX ADMIN — FUROSEMIDE 80 MG: 10 INJECTION, SOLUTION INTRAMUSCULAR; INTRAVENOUS at 09:07

## 2017-07-26 RX ADMIN — CARBIDOPA AND LEVODOPA 1 TABLET: 50; 200 TABLET, EXTENDED RELEASE ORAL at 10:07

## 2017-07-26 RX ADMIN — FAMOTIDINE 20 MG: 20 TABLET, FILM COATED ORAL at 09:07

## 2017-07-26 NOTE — PLAN OF CARE
07/26/17 1110   Discharge Assessment   Assessment Type Discharge Planning Assessment   Confirmed/corrected address and phone number on facesheet? Yes   Assessment information obtained from? Patient;Medical Record   Prior to hospitilization cognitive status: Alert/Oriented   Prior to hospitalization functional status: Assistive Equipment;Needs Assistance;Partially Dependent   Current cognitive status: Alert/Oriented   Current Functional Status: Assistive Equipment;Needs Assistance;Partially Dependent   Lives With facility resident  ([facility])   Able to Return to Prior Arrangements yes   Is patient able to care for self after discharge? Yes   Patient's perception of discharge disposition nursing home   Readmission Within The Last 30 Days no previous admission in last 30 days   If YES, was patient admitted for the same reason? No   Patient currently being followed by outpatient case management? No   Patient currently receives home health services? No   Does the patient currently use HME? Yes   Patient currently receives private duty nursing? No   Patient currently receives any other outside agency services? No   Do you have any problems affording any of your prescribed medications? No   Is the patient taking medications as prescribed? yes   Do you have any financial concerns preventing you from receiving the healthcare you need? No   Does the patient have transportation to healthcare appointments? Yes   Transportation Available ambulance;van, wheelchair accessible

## 2017-07-26 NOTE — PLAN OF CARE
Problem: Patient Care Overview  Goal: Plan of Care Review  Outcome: Ongoing (interventions implemented as appropriate)  Pt's SpO2 97% on 2 lpm NC. No respiratory distress noted. Will continue to monitor SpO2.

## 2017-07-26 NOTE — PLAN OF CARE
Problem: Occupational Therapy Goal  Goal: Occupational Therapy Goal  Outcome: Outcome(s) achieved Date Met: 07/26/17  Pt performing at baseline for ADLs at this time. Per nsg at NH, pt requires total A for all ADLs except feeding; pt noted to feed self meal with supervision 2/2 impulsive eating behavior. No further OT needs at this time. D/c OT

## 2017-07-26 NOTE — ASSESSMENT & PLAN NOTE
Holding losartan.  May have developed progressive renal failure since last admission.  Creatinine stable since admission.

## 2017-07-26 NOTE — PLAN OF CARE
Problem: Patient Care Overview  Goal: Plan of Care Review  Sats 98% 2L NC , will continue to monitor.

## 2017-07-26 NOTE — PROGRESS NOTES
Sent clinicals via [employer] to [facility].    Lisa Mujica, RN, CCM, CMSRN  RN Transition Navigator  [phone]

## 2017-07-26 NOTE — PLAN OF CARE
Problem: Physical Therapy Goal  Goal: Physical Therapy Goal  Goals to be met by: 2017     Patient will increase functional independence with mobility by performin. Supine to sit with MInimal Assistance  2. Bed to chair transfer with Moderate Assistance using Rolling Walker  3. Sitting at edge of bed x20 minutes with Modified [place]    Outcome: Ongoing (interventions implemented as appropriate)  Recommend back to [facility]  No DME needs

## 2017-07-26 NOTE — PLAN OF CARE
Problem: Patient Care Overview  Goal: Plan of Care Review  Outcome: Ongoing (interventions implemented as appropriate)  Plan of care discussed, patient needed reorientation. Education given on diagnosis. Understanding verbalized but repetition needed. Goals created and achievement desired. All safety measures maintained.  Bed locked and in lowest position. Bed alarm maintained.Incontinence care given. Call light within reach. Telemetry monitor on and audible, showing no red alarms.    Problem: Pressure Ulcer Risk (Nitin Scale) (Adult,Obstetrics,Pediatric)  Goal: Identify Related Risk Factors and Signs and Symptoms  Related risk factors and signs and symptoms are identified upon initiation of Human Response Clinical Practice Guideline (CPG)   Outcome: Ongoing (interventions implemented as appropriate)  Patient turned every two hours throughout shift. Positioning supports utilized to turn and elevate extremities

## 2017-07-26 NOTE — PT/OT/SLP EVAL
Occupational Therapy  Evaluation/Dc Summary     Eliecer Dickson   MRN: [id number]   Admitting Diagnosis: Acute on chronic diastolic congestive heart failure    OT Date of Treatment: 07/26/17   OT Start Time: 1034  OT Stop Time: 1106  OT Total Time (min): 32 min    Billable Minutes:  Evaluation 15 co treatment wiht PT     Diagnosis: Acute on chronic diastolic congestive heart failure   The primary encounter diagnosis was Acute renal failure, unspecified acute renal failure type. Diagnoses of Hypervolemia, unspecified hypervolemia type, Acute on chronic diastolic heart failure, Leg swelling, NSTEMI, initial episode of care, Acute on chronic diastolic congestive heart failure, Right bundle branch block, and Systolic congestive heart failure were also pertinent to this visit.      Past Medical History:   Diagnosis Date    Arthritis     Asthma     BPH without obstruction/lower urinary tract symptoms     Choledocholithiasis with obstruction     Coronary artery disease     Depression     Diastolic CHF     Gait instability     GERD (gastroesophageal reflux disease)     Hypertension     Hypocalcemia     Melanoma     melanoma    Mitral regurgitation     Parkinson disease     Physical deconditioning     Pulmonary hypertension     Right bundle branch block     Thyroid disease       Past Surgical History:   Procedure Laterality Date    CHOLECYSTECTOMY  unsure    HIP FRACTURE SURGERY Left     left broken hip repair    TONSILLECTOMY, ADENOIDECTOMY             General Precautions: Standard, fall, aspiration  Orthopedic Precautions: N/A  Braces:            Patient History:  Living Environment  Lives With: facility resident  Living Arrangements: extended care facility  Living Environment Comment: Pt lives at [facility]; requires assist all ADLs rodolfo functional transfers. Does not ambulate, stand pivot t/fs to w/c with max A; self propels w/c   Equipment Currently Used at Home: wheelchair    Prior level of  function:   Bed Mobility/Transfers: needs device and assist  Grooming: needs assist  Bathing: needs device and assist  Upper Body Dressing: needs assist  Lower Body Dressing: needs assist  Toileting: unable to perform  Home Management Skills: unable to perform  Driving License: No     Dominant hand: right    Subjective:  Communicated with nsg prior to session.  Pt reports he would like to sit on the side of the bed and attempt to stand   Chief Complaint: none stated   Patient/Family stated goals: none stated     Pain/Comfort  Pain Rating 1:  (reports L leg; did not rate )    Objective:       Cognitive Exam:  Oriented to: Person, Place and Time  Follows Commands/attention: Follows multistep  commands  Communication: clear; broken from  parkisons   Memory:  Impaired LTM  Safety awareness/insight to disability: impaired  Coping skills/emotional control: Appropriate to situation    Visual/perceptual:  Intact    Physical Exam:  Postural examination/scapula alignment: Rounded shoulder and Head forward  Skin integrity: Wound BLEs  Edema: Moderate BLEs    Sensation:   Intact    Upper Extremity Range of Motion:  Right Upper Extremity: WFL  Left Upper Extremity: WFL    Upper Extremity Strength:  Right Upper Extremity: decreased throughout 4-/5  Left Upper Extremity: decreased throughout; 4-/5    Strength: good     Fine motor coordination:   NT        Functional Mobility:  Bed Mobility:  Rolling/Turning to Left: Maximum assistance  Rolling/Turning Right: Maximum assistance  Scooting/Bridging: Maximum Assistance  Supine to Sit: Maximum Assistance  Sit to Supine: Maximum Assistance    Transfers:  Sit <> Stand Assistance: Moderate Assistance (of 2 therapists; 3 trials )  Sit <> Stand Assistive Device: Rolling Walker        Activities of Daily Living:  Feeding Level of Assistance: Supervision with cues for safety and appropriate amounts     LE Dressing Level of Assistance: Total assistance     Toileting Where Assessed: Bed  "level  Toileting Level of Assistance: Total assistance      Balance:   Static Sit: FAIR+: Able to take MINIMAL challenges from all directions  Dynamic Sit: FAIR: Cannot move trunk without losing balance  Static Stand: POOR: Needs MODERATE assist to maintain  Dynamic stand: POOR: N/A        AM-PAC 6 CLICK ADL  How much help from another person does this patient currently need?  1 = Unable, Total/Dependent Assistance  2 = A lot, Maximum/Moderate Assistance  3 = A little, Minimum/Contact Guard/Supervision  4 = None, Modified [place]/Independent    Putting on and taking off regular lower body clothing? : 1  Bathing (including washing, rinsing, drying)?: 2  Toileting, which includes using toilet, bedpan, or urinal? : 1  Putting on and taking off regular upper body clothing?: 2  Taking care of personal grooming such as brushing teeth?: 3  Eating meals?: 3  Total Score: 12    AM-PAC Raw Score CMS "G-Code Modifier Level of Impairment Assistance   6 % Total / Unable   7 - 9 CM 80 - 100% Maximal Assist   10-14 CL 60 - 80% Moderate Assist   15 - 19 CK 40 - 60% Moderate Assist   20 - 22 CJ 20 - 40% Minimal Assist   23 CI 1-20% SBA / CGA   24 CH 0% Independent/ Mod I       Patient left supine with all lines intact, call button in reach, bed alarm on and nsg notified    Assessment:  Eliecer Dickson is a 89 y.o. male with a medical diagnosis of Acute on chronic diastolic congestive heart failure and presents with deconditioning. Pt performing at baseline for ADLs at this time. Per nsg at NH, pt requires total A for all ADLs except feeding; pt noted to feed self meal with supervision 2/2 impulsive eating behavior. No further OT needs at this time. D/c OT     Rehab identified problem list/impairments: Rehab identified problem list/impairments: weakness, gait instability, impaired endurance, impaired balance, decreased safety awareness, impaired self care skills, impaired cognition, impaired functional mobilty, decreased " coordination, edema, impaired skin, pain    Rehab potential is fair.    Activity tolerance: Fair    Discharge recommendations: Discharge Facility/Level Of Care Needs:  (return to [facility] )     Barriers to discharge: Barriers to Discharge: None    Equipment recommendations: none     GOALS:    Occupational Therapy Goals     Not on file          Multidisciplinary Problems (Resolved)        Problem: Occupational Therapy Goal    Goal Priority Disciplines Outcome Interventions   Occupational Therapy Goal   (Resolved)     OT, PT/OT Outcome(s) achieved                    PLAN:  Patient to be seen  (D/c OT) to address the above listed problems via    Plan of Care expires: 07/26/17  Plan of Care reviewed with: patient    OT G-codes  Functional Assessment Tool Used: am pac   Score: 12  Functional Limitation: Self care  Self Care Current Status (): CL  Self Care Goal Status (): CL  Self Care Discharge Status (): HARPREET Sam OT  07/26/2017

## 2017-07-26 NOTE — PROGRESS NOTES
[facility] Hospital Medicine  Progress Note    Patient Name: Eliecer Dickson  MRN: [id number]  Patient Class: IP- Inpatient   Admission Date: 7/25/2017  Length of Stay: 1 days  Attending Physician: Matthew Ma MD  Primary Care Provider: To Trujillo MD        Subjective:     Principal Problem:Acute on chronic diastolic congestive heart failure    HPI:  Eliecer Dickson is a 89 y.o. paranoid schizophrenia, coronary artery disease, severe mitral regurgitation, aortic stenosis, heart failure with preserved ejection fraction, right bundle branch block, hypothyroidism, Parkinson disease, history of left hip fracture status post repair, benign prostatic hyperplasia, hypothyroidism, osteoarthritis, and lactose intolerance.  He lives in [facility] in [place], [place].  He has a fluid restriction (dietary gives 1260 mL, nursing gives 240 mL).  He is full code.  He was in the [employer] and [place].  His primary care physician is Dr. To Izquierdo.    He was sent to [facility] ED on 7/25/17 after the nurse practitioner felt he was having a heart failure exacerbation.  He had bilateral pitting edema, cough, and orthopnea.  He typically takes furosemide 40 mg daily.  In the ED he was found to have hypoxia, tachypnea, relative hypotension (90s/40s), elevated troponin (0.359), evidence of heart failure exacerbation with elevated BNP (1621), prominent pulmonary vasculature and perihilar ground glass opacities on chest x-ray, and acute kidney injury (BUN 40, creatinine 3.1).  He was admitted to [facility].    Hospital Course:  He was put on furosemide 80 mg IV twice daily.  Renal ultrasound showed an enlarged prostate and medical renal disease but no obstruction.      Interval History: Not feeling short of breath on 2 liters nasal cannula.    Review of Systems   Constitutional: Negative for chills and fever.    Respiratory: Negative for cough and shortness of breath.      Objective:     Vital Signs (Most Recent):  Temp: 97.1 °F (36.2 °C) (07/26/17 1200)  Pulse: 88 (07/26/17 1200)  Resp: 18 (07/26/17 1200)  BP: (!) 104/54 (07/26/17 1200)  SpO2: 97 % (07/26/17 0930) Vital Signs (24h Range):  Temp:  [97.1 °F (36.2 °C)-97.8 °F (36.6 °C)] 97.1 °F (36.2 °C)  Pulse:  [53-88] 88  Resp:  [12-25] 18  SpO2:  [96 %-100 %] 97 %  BP: ()/(45-66) 104/54     Weight: 81.6 kg (179 lb 14.3 oz)  Body mass index is 29.04 kg/m².    Intake/Output Summary (Last 24 hours) at 07/26/17 1238  Last data filed at 07/26/17 1200   Gross per 24 hour   Intake              250 ml   Output             1294 ml   Net            -1044 ml      Physical Exam   Constitutional: He is oriented to person, place, and time. He appears well-developed. No distress.   Cardiovascular: Normal rate.    Murmur heard.   Systolic murmur is present   Pulmonary/Chest: Effort normal. He has rales.   Neurological: He is alert and oriented to person, place, and time.   Nursing note and vitals reviewed.      Significant Labs:   CMP:   Recent Labs  Lab 07/25/17  1208 07/26/17  0516    141   K 3.6 3.8   CL 97 97   CO2 35* 33*   GLU 90 110   BUN 40* 51*   CREATININE 3.1* 3.1*   CALCIUM 8.2* 8.1*   PROT 6.0  --    ALBUMIN 2.8*  --    BILITOT 0.5  --    ALKPHOS 69  --    AST 9*  --    ALT <5*  --    ANIONGAP 9 11   EGFRNONAA 17* 17*     All pertinent labs within the past 24 hours have been reviewed.    Significant Imaging: I have reviewed all pertinent imaging results/findings within the past 24 hours.   2D echo with color flow doppler 7/25/17:     1 - Normal left ventricular systolic function (EF 55-60%).     2 - Impaired LV relaxation, elevated LAP (grade 2 diastolic dysfunction).     3 - Pulmonary hypertension. The estimated PA systolic pressure is 54 mmHg.     4 - Moderate aortic stenosis, LAURA = 1.15 cm2, peak velocity = 2.95 m/s, mean gradient = 20 mmHg.     5 - Moderate  aortic regurgitation.     6 - Moderate mitral regurgitation.     7 - Increased central venous pressure.   US Retroperitoneal Complete 7/25/17: Right kidney measures 10.3 x 4.6 x 4.9 cm left kidney measures 11.0 x 5.1 x 8.4 cm.  No hydronephrosis, nephrolithiasis or solid renal masses.  The cortical thickness is diminished.  Corticomedullary differentiation is also diminished.  Flow to the kidneys is decreased.  Resistive indices are within normal limits of 0.62 on the right and 0.50 on the left.  Urinary bladder is unremarkable.  Prostate gland is enlarged.  No ascites.  Impression:  1.  Findings suggestive medical renal disease.  No hydronephrosis.  2.  Enlarged prostate gland.    Assessment/Plan:      * Acute on chronic diastolic congestive heart failure    Acute hypoxemic respiratory failure  Severe mitral regurgitation  Pulmonary hypertension  Takes furosemide 40 mg daily at home.  Give furosemide 80 mg IV BID.  Holding losartan 50 mg daily.  Monitor intake and output.  Monitor renal function and electrolytes.  Wean oxygen as tolerated.            NSTEMI (non-ST elevated myocardial infarction)    Troponin decreased and likely due to demand ischemia from heart failure.        Acute kidney injury    Holding losartan.  May have developed progressive renal failure since last admission.  Creatinine stable since admission.        Benign non-nodular prostatic hyperplasia with lower urinary tract symptoms    Continue tamsulosin 0.4 mg daily, finasteride 5 mg daily.          Coronary artery disease involving native coronary artery of native heart    Continue aspirin 81 mg daily, clopidrogel 75 mg daily, simvastatin 40 mg HS.  Hold isosorbide mononitrate 30 mg daily.          Paranoid schizophrenia, chronic condition    Continue quetiapine 12.5 mg BID and 50 mg nightly, risperidone 0.5 mg BID, lorazepam 0.5 mg q6h PRN.  Will hold quetiapine if hypotension continues to be an issue.          Acquired hypothyroidism     Continue levothyroxine 125 mcg daily.          Parkinson disease    Continue carbidopa-levodopa  mg TID.  Fall precautions.            VTE Risk Mitigation         Ordered     heparin (porcine) injection 7,500 Units  Every 8 hours     Route:  Subcutaneous        07/25/17 1749     Medium Risk of VTE  Once      07/25/17 1749        Disposition: Return to [facility] probably in a couple of days.    Matthew Ma MD  Department of Hospital Medicine   [facility]

## 2017-07-26 NOTE — SUBJECTIVE & OBJECTIVE
Interval History: Not feeling short of breath on 2 liters nasal cannula.    Review of Systems   Constitutional: Negative for chills and fever.   Respiratory: Negative for cough and shortness of breath.      Objective:     Vital Signs (Most Recent):  Temp: 97.1 °F (36.2 °C) (07/26/17 1200)  Pulse: 88 (07/26/17 1200)  Resp: 18 (07/26/17 1200)  BP: (!) 104/54 (07/26/17 1200)  SpO2: 97 % (07/26/17 0930) Vital Signs (24h Range):  Temp:  [97.1 °F (36.2 °C)-97.8 °F (36.6 °C)] 97.1 °F (36.2 °C)  Pulse:  [53-88] 88  Resp:  [12-25] 18  SpO2:  [96 %-100 %] 97 %  BP: ()/(45-66) 104/54     Weight: 81.6 kg (179 lb 14.3 oz)  Body mass index is 29.04 kg/m².    Intake/Output Summary (Last 24 hours) at 07/26/17 1238  Last data filed at 07/26/17 1200   Gross per 24 hour   Intake              250 ml   Output             1294 ml   Net            -1044 ml      Physical Exam   Constitutional: He is oriented to person, place, and time. He appears well-developed. No distress.   Cardiovascular: Normal rate.    Murmur heard.   Systolic murmur is present   Pulmonary/Chest: Effort normal. He has rales.   Neurological: He is alert and oriented to person, place, and time.   Nursing note and vitals reviewed.      Significant Labs:   CMP:   Recent Labs  Lab 07/25/17  1208 07/26/17  0516    141   K 3.6 3.8   CL 97 97   CO2 35* 33*   GLU 90 110   BUN 40* 51*   CREATININE 3.1* 3.1*   CALCIUM 8.2* 8.1*   PROT 6.0  --    ALBUMIN 2.8*  --    BILITOT 0.5  --    ALKPHOS 69  --    AST 9*  --    ALT <5*  --    ANIONGAP 9 11   EGFRNONAA 17* 17*     All pertinent labs within the past 24 hours have been reviewed.    Significant Imaging: I have reviewed all pertinent imaging results/findings within the past 24 hours.   2D echo with color flow doppler 7/25/17:     1 - Normal left ventricular systolic function (EF 55-60%).     2 - Impaired LV relaxation, elevated LAP (grade 2 diastolic dysfunction).     3 - Pulmonary hypertension. The estimated PA  systolic pressure is 54 mmHg.     4 - Moderate aortic stenosis, LAURA = 1.15 cm2, peak velocity = 2.95 m/s, mean gradient = 20 mmHg.     5 - Moderate aortic regurgitation.     6 - Moderate mitral regurgitation.     7 - Increased central venous pressure.   US Retroperitoneal Complete 7/25/17: Right kidney measures 10.3 x 4.6 x 4.9 cm left kidney measures 11.0 x 5.1 x 8.4 cm.  No hydronephrosis, nephrolithiasis or solid renal masses.  The cortical thickness is diminished.  Corticomedullary differentiation is also diminished.  Flow to the kidneys is decreased.  Resistive indices are within normal limits of 0.62 on the right and 0.50 on the left.  Urinary bladder is unremarkable.  Prostate gland is enlarged.  No ascites.  Impression:  1.  Findings suggestive medical renal disease.  No hydronephrosis.  2.  Enlarged prostate gland.

## 2017-07-26 NOTE — PLAN OF CARE
Problem: Patient Care Overview (Adult)  Goal: Plan of Care Review  Outcome: Ongoing (interventions implemented as appropriate)  Recommendation/Intervention:   1. Continue to encourage good intake at meals.    Goals:   Pt will consume at least 50% intake at meals  Nutrition Goal Status: new  Communication of RD Recs: reviewed with RN (Ashley-reports eating good)

## 2017-07-26 NOTE — HOSPITAL COURSE
He was put on furosemide 80 mg IV twice daily.  Losartan was stopped.  Renal ultrasound showed an enlarged prostate and medical renal disease but no obstruction.  Leg edema improved and he remained asymptomatic the next 2 days.  Creatinine remained stable at 3.1 while diuresing, which probably indicates that he developed chronic kidney disease in the 6 months since his last labs.  Hypoxia improved.  He was noted to have a stage 2 sacral decubitus ulcer and stage 1 bilateral heel ulcers.  He was discharged back to the [facility] on 7/27/17.  His home furosemide was increased from 40 mg daily to 80 mg twice daily.  Losartan was discontinued.

## 2017-07-26 NOTE — PT/OT/SLP EVAL
Speech Language Pathology  Bedside Swallow Study  Evaluation    Eliecer Dickson   MRN: [id number]   [id number]    Admitting Diagnosis: Acute on chronic diastolic congestive heart failure    Diet recommendations: Solid Diet Level: Regular  Liquid Diet Level: Thin   · Feed only when awake/alert,   · HOB to 90 degrees,   · Small bites/sips,   · 1 bite/sip at a time,   · Check for pocketing/oral residue,   · Remain upright 30 minutes post meal,   · Eliminate distractions and   · Avoid talking while eating  · MONITOR WITH MEALS to cue safe swallowing precautions    Continue to monitor for signs and symptoms of aspiration and discontinue oral feeding should you notice any of the following: watery eyes, reddened facial area, wet vocal quality, increased work of breathing, change in respiratory status, increased congestion, coughing, fever, etc.      SLP Treatment Date: 07/26/17  Speech Start Time: 1019     Speech Stop Time: 1036     Speech Total (min): 17 min       TREATMENT BILLABLE MINUTES:  Eval Swallow and Oral Function 17    Diagnosis: Acute on chronic diastolic congestive heart failure       HPI: Eliecer Dickson is a 89 y.o. paranoid schizophrenia, coronary artery disease, severe mitral regurgitation, aortic stenosis, heart failure with preserved ejection fraction, right bundle branch block, hypothyroidism, Parkinson disease, history of left hip fracture status post repair, benign prostatic hyperplasia, hypothyroidism, osteoarthritis, and lactose intolerance.  He lives in [facility] in [place], [place].  He has a fluid restriction (dietary gives 1260 mL, nursing gives 240 mL).  He is full code.  He was in the [employer] and [place].  His primary care physician is Dr. To Izquierdo.  He was sent to [facility] ED on 7/25/17 after the nurse practitioner felt he was having a heart failure exacerbation.  He had bilateral pitting edema, cough,  and orthopnea.  He typically takes furosemide 40 mg daily.  In the ED he was found to have hypoxia, tachypnea, relative hypotension (90s/40s), elevated troponin (0.359), evidence of heart failure exacerbation with elevated BNP (1621), prominent pulmonary vasculature and perihilar ground glass opacities on chest x-ray, and acute kidney injury (BUN 40, creatinine 3.1).  He was admitted to [facility].    CXR: Prominence of central pulmonary vessels and perihilar groundglass opacities most suggestive of edema.  Atypical infection less likely.    Past Medical History:   Diagnosis Date    Arthritis     Asthma     BPH without obstruction/lower urinary tract symptoms     Choledocholithiasis with obstruction     Coronary artery disease     Depression     Diastolic CHF     Gait instability     GERD (gastroesophageal reflux disease)     Hypertension     Hypocalcemia     Melanoma     melanoma    Mitral regurgitation     Parkinson disease     Physical deconditioning     Pulmonary hypertension     Right bundle branch block     Thyroid disease      Past Surgical History:   Procedure Laterality Date    CHOLECYSTECTOMY  unsure    HIP FRACTURE SURGERY Left     left broken hip repair    TONSILLECTOMY, ADENOIDECTOMY         Has the patient been evaluated by SLP for swallowing? : Yes  Keep patient NPO?: No   General Precautions: Standard, aspiration, fall    Current Respiratory Status: nasal cannula     Prior diet: reg/thin.    Subjective:  Pt found eating breakfast tray putting entire muffin in mouth with nasal cannula in bed. Nasal cannula placed back on pt.   Patient goals: none stated.    Pain/Comfort  Pain Rating 1: 0/10    Objective:      Oral Musculature Evaluation  Oral Musculature: WFL  Dentition: present and adequate  Mucosal Quality: adequate  Mandibular Strength and Mobility: WFL  Oral Labial Strength and Mobility: WFL  Lingual Strength and Mobility: WFL  Velar Elevation: WFL  Buccal  Strength and Mobility: WFL, decreased tone  Volitional Cough: elicited; wet/productive  Volitional Swallow: timely  Voice Prior to PO Intake: clear     Bedside Swallow Eval:  Consistencies Assessed: breakfast tray including: eggs, grits, muffin, OJ, coffee  Oral Phase: Pt very impulsive placing entire muffin in mouth at once and taking multiple large consecutive sips of liquid requiring frequent cues throughout assessment to slow down and take small bites/sips  Pharyngeal Phase: coughing s/p very large bites/sips; no s/s of aspiration when cued to slow down/take smaller bites/sips.     Swallow study completed. Pt very impulsive with eating/drinking. Coughing noted during meal following very large bites. Pt required constant cues to slow down and take smaller bites/sips. No overt s/s of aspiration noted with smaller bites/sips. Rec: reg diet, thin liquids, MONITOR WITH MEALS to cue safe swallowing precautions (slow rate, small single bites and sips, upright for all po intake, NO TALKING while eating). ST will f/u to ensure compliance with safe swallowing precautions and assess diet tolerance.     Assessment:  Eliecer Dickson is a 89 y.o. male with a medical diagnosis of Acute on chronic diastolic congestive heart failure and presents with impulsivity with eating/drinking. ST will f/u to assess diet tolerance.           Discharge recommendations: Discharge Facility/Level Of Care Needs:  (tbd)     Goals:    SLP Goals        Problem: SLP Goal    Goal Priority Disciplines Outcome   SLP Goal     SLP Ongoing (interventions implemented as appropriate)   Description:  Short Term Goals:   1. Pt will tolerate a regular diet and thin liquids with no overt s/s of aspiration.   2. Pt will demonstrate/verbalize 3 safe swallowing precautions with min cues.                      Plan:   Patient to be seen Therapy Frequency: 3 x/week   Plan of Care expires: 08/24/17  Plan of Care reviewed with: patient (RN)  SLP Follow-up?: Yes  SLP -  Next Visit Date: 07/27/17      SLP G-Codes  Functional Assessment Tool Used: noms  Score: 6  Functional Limitations: Swallowing  Swallow Current Status (): CI  Swallow Goal Status (): ANDRES Baxter, CCC-SLP  07/26/2017

## 2017-07-26 NOTE — PLAN OF CARE
Problem: SLP Goal  Goal: SLP Goal  Short Term Goals:   1. Pt will tolerate a regular diet and thin liquids with no overt s/s of aspiration.   2. Pt will demonstrate/verbalize 3 safe swallowing precautions with min cues.   Outcome: Ongoing (interventions implemented as appropriate)  Swallow study completed. Pt very impulsive with eating/drinking. Coughing noted during meal following very large bites. Pt required cues to slow down and take smaller bites/sips. No overt s/s of aspiration noted with smaller bites/sips. Rec: reg diet, thin liquids, MONITOR WITH MEALS to cue safe swallowing precautions (slow rate, small single bites and sips, upright for all po intake, NO TALKING while eating). ST will f/u to ensure compliance with safe swallowing precautions and assess diet tolerance.   BOBBY Pettit., CCC-SLP  07/26/2017

## 2017-07-26 NOTE — PT/OT/SLP EVAL
Physical Therapy  Evaluation    Eliecer Dickson   MRN: [id number]   Admitting Diagnosis: Acute on chronic diastolic congestive heart failure    PT Received On: 07/26/17  PT Start Time: 1034     PT Stop Time: 1103    PT Total Time (min): 29 min       Billable Minutes:  Evaluation 10 and Therapeutic Activity 10    Diagnosis: Acute on chronic diastolic congestive heart failure  The primary encounter diagnosis was Acute renal failure, unspecified acute renal failure type. Diagnoses of Hypervolemia, unspecified hypervolemia type, Acute on chronic diastolic heart failure, Leg swelling, NSTEMI, initial episode of care, Acute on chronic diastolic congestive heart failure, Right bundle branch block, and Systolic congestive heart failure were also pertinent to this visit.      Past Medical History:   Diagnosis Date    Arthritis     Asthma     BPH without obstruction/lower urinary tract symptoms     Choledocholithiasis with obstruction     Coronary artery disease     Depression     Diastolic CHF     Gait instability     GERD (gastroesophageal reflux disease)     Hypertension     Hypocalcemia     Melanoma     melanoma    Mitral regurgitation     Parkinson disease     Physical deconditioning     Pulmonary hypertension     Right bundle branch block     Thyroid disease       Past Surgical History:   Procedure Laterality Date    CHOLECYSTECTOMY  unsure    HIP FRACTURE SURGERY Left     left broken hip repair    TONSILLECTOMY, ADENOIDECTOMY         Referring physician: Anil  Date referred to PT: 7/26/2017    General Precautions: Standard, fall  Orthopedic Precautions: N/A   Braces: N/A            Patient History:  Lives With: facility resident  Living Arrangements: extended care facility  Transportation Available: van, wheelchair accessible, ambulance  Equipment Currently Used at Home: wheelchair  DME owned (not currently used): none    Previous Level of Function:  Ambulation Skills: unable to perform  Transfer  Skills: needs assist  ADL Skills: needs assist    Subjective:  [facility]    Chief Complaint: pain L leg  Patient goals: none voiced    Pain/Comfort  Pain Rating 1:  (did not rate on scale )  Location - Side 1: Left  Location 1: leg  Pain Addressed 1: Reposition, Distraction      Objective:   Patient found with: oxygen, telemetry     Cognitive Exam:  Oriented to: Person and Place    Follows Commands/attention: Follows one-step commands  Communication: clear  Safety awareness/insight to disability: impaired    Physical Exam:  Postural examination/scapula alignment: Rounded shoulder    Skin integrity: Wound BLE  Edema: Mild BLE    Sensation:   na    Upper Extremity Range of Motion:See OT note  Right Upper Extremity:   Left Upper Extremity:     Upper Extremity Strength:  Right Upper Extremity:   Left Upper Extremity:     Lower Extremity Range of Motion:  Right Lower Extremity: Limited knee and ankle  Left Lower Extremity: Limited knee and ankle    Lower Extremity Strength:  Right Lower Extremity: 4/5 to 3/5  Left Lower Extremity: 2/5 to +3/5     Fine motor coordination:  na    Gross motor coordination: impaired    Functional Mobility:  Bed Mobility:  Supine to Sit: Maximum Assistance  Sit to Supine: Maximum Assistance    Transfers:  Sit <> Stand Assistance: Moderate Assistance, Other (see comments) (+2 assist)  Sit <> Stand Assistive Device: Rolling Walker    Gait:   Gait Distance: did not occur    Stairs:  na    Balance:   Static Sit: FAIR-: Maintains without assist but inconsistent   Dynamic Sit: FAIR: Cannot move trunk without losing balance  Static Stand: POOR: Needs MODERATE assist to maintain  Dynamic stand: na    Therapeutic Activities and Exercises:  caren    AM-PAC 6 CLICK MOBILITY  How much help from another person does this patient currently need?   1 = Unable, Total/Dependent Assistance  2 = A lot, Maximum/Moderate Assistance  3 = A little, Minimum/Contact  Guard/Supervision  4 = None, Modified [place]/Independent    Turning over in bed (including adjusting bedclothes, sheets and blankets)?: 2  Sitting down on and standing up from a chair with arms (e.g., wheelchair, bedside commode, etc.): 2  Moving from lying on back to sitting on the side of the bed?: 2  Moving to and from a bed to a chair (including a wheelchair)?: 2  Need to walk in hospital room?: 1  Climbing 3-5 steps with a railing?: 1  Total Score: 10     AM-PAC Raw Score CMS G-Code Modifier Level of Impairment Assistance   6 % Total / Unable   7 - 9 CM 80 - 100% Maximal Assist   10 - 14 CL 60 - 80% Moderate Assist   15 - 19 CK 40 - 60% Moderate Assist   20 - 22 CJ 20 - 40% Minimal Assist   23 CI 1-20% SBA / CGA   24 CH 0% Independent/ Mod I     Patient left HOB elevated with all lines intact, call button in reach and bed alarm on.    Assessment:   Eliecer Dickson is a 89 y.o. male with a medical diagnosis of Acute on chronic diastolic congestive heart failure and presents with weakness, significant LLD on L shorter than R, and decline from PLOF . Patient at baseline WC bound, needs assist with bed mobility and transfers. .    Rehab identified problem list/impairments: Rehab identified problem list/impairments: weakness, edema, impaired functional mobilty, impaired self care skills, impaired skin, pain, impaired cognition, impaired endurance, gait instability, impaired balance, decreased lower extremity function, decreased safety awareness    Rehab potential is fair.    Activity tolerance: Fair    Discharge recommendations: Discharge Facility/Level Of Care Needs:  (return to [facility])     Barriers to discharge: Barriers to Discharge: None    Equipment recommendations: Equipment Needed After Discharge: none     GOALS:    Physical Therapy Goals        Problem: Physical Therapy Goal    Goal Priority Disciplines Outcome Goal Variances Interventions   Physical Therapy Goal     PT/OT, PT Ongoing  (interventions implemented as appropriate)     Description:  Goals to be met by: 2017     Patient will increase functional independence with mobility by performin. Supine to sit with MInimal Assistance  2. Bed to chair transfer with Moderate Assistance using Rolling Walker  3. Sitting at edge of bed x20 minutes with Modified [place]                      PLAN:    Patient to be seen 3 x/week to address the above listed problems via gait training, therapeutic activities, therapeutic exercises  Plan of Care expires: 17  Plan of Care reviewed with: patient          Behzad Narvaez, PT  2017

## 2017-07-26 NOTE — ASSESSMENT & PLAN NOTE
Acute hypoxemic respiratory failure  Severe mitral regurgitation  Pulmonary hypertension  Takes furosemide 40 mg daily at home.  Give furosemide 80 mg IV BID.  Holding losartan 50 mg daily.  Monitor intake and output.  Monitor renal function and electrolytes.  Wean oxygen as tolerated.

## 2017-07-26 NOTE — CONSULTS
"  [facility]  Adult Nutrition  Consult Note    SUMMARY     Recommendations    Recommendation/Intervention:   1. Continue to encourage good intake at meals.    Goals:   Pt will consume at least 50% intake at meals  Nutrition Goal Status: new  Communication of RD Recs: reviewed with RN (Ashley-reports eating good)    Continuum of Care Plan  Referral to Outpatient Services:  (pt to d/c on low Na lactose free 1500ml fluid diet)    Reason for Assessment  Reason for Assessment: nurse/nurse practitioner consult (dysphagia)  Diagnosis: cardiac disease  Relevent Medical History: thyroid disease, Parkinsons disease, HTN, GERD, CAD, cholecystectomy      General Information Comments: Pt on low Na lactose free 1500ml fluid diet. Pt asleep at visit. RN reports pt eating very good at meals. No nutrition dx at this time.      Nutrition Prescription Ordered  Current Diet Order: Low Na lactose free 1500ml fluid    Evaluation of Received Nutrients/Fluid Intake  % Intake of Estimated Energy Needs: 50 - 75 %  % Meal Intake: 75%     Nutrition Risk Screen  Nutrition Risk Screen: dysphagia or difficulty swallowing    Nutrition/Diet History  Food Preferences: no [place] or cultural food prefs identified  Factors Affecting Nutritional Intake: difficulty/impaired swallowing    Labs/Tests/Procedures/Meds  Pertinent Labs Reviewed: reviewed  Pertinent Labs Comments: BUN 51H, Crea 3.1H, Ca 8.1L, Phos 6.8L  Pertinent Medications Reviewed: reviewed  Pertinent Medications Comments: Lasix    Physical Findings  Overall Physical Appearance: overweight  Tubes:  (none)  Oral/Mouth Cavity:  (unable to assess)  Skin:  (Nitin 10-leg ulceration/IIb sacral spine, I R heel)    Anthropometrics  Temp: 97.1 °F (36.2 °C)  Height: 5' 6" (167.6 cm)  Weight Method: Estimated  Weight: 81.6 kg (179 lb 14.3 oz)  Ideal Body Weight (IBW), Male: 142 lb  % Ideal Body Weight, Male (lb): 126.69 lb  BMI (Calculated): 29.1  BMI Grade: 25 - 29.9 - " overweight    Estimated/Assessed Needs  Weight Used For Calorie Calculations: 64.5 kg (142 lb 3.2 oz) (IBW)   Energy Need Method: Kcal/kg (1935 (30 kcal/kg IBW))  RMR (Gregory-St. Jeor Equation): 1247.75  Weight Used For Protein Calculations: 64.5 kg (142 lb 3.2 oz) (IBW)  Protein Requirements: 65-77g (1.0-1.2 g/kg IBW)  Fluid Requirements (mL):  (1ml/kcal)  Fluid Need Method: RDA Method     Assessment and Plan  No new Assessment & Plan notes have been filed under this hospital service since the last note was generated.  Service: Nutrition    Monitor and Evaluation  Food and Nutrient Intake: food and beverage intake  Food and Nutrient Adminstration: diet order  Physical Activity and Function: nutrition-related ADLs and IADLs  Anthropometric Measurements: weight  Biochemical Data, Medical Tests and Procedures: electrolyte and renal panel  Nutrition-Focused Physical Findings: overall appearance    Nutrition Risk  Level of Risk:  (1xweekly)    Nutrition Follow-Up  RD Follow-up?: Yes

## 2017-07-27 VITALS
SYSTOLIC BLOOD PRESSURE: 124 MMHG | DIASTOLIC BLOOD PRESSURE: 61 MMHG | WEIGHT: 179.88 LBS | HEIGHT: 66 IN | TEMPERATURE: 98 F | BODY MASS INDEX: 28.91 KG/M2 | RESPIRATION RATE: 18 BRPM | HEART RATE: 90 BPM | OXYGEN SATURATION: 92 %

## 2017-07-27 PROBLEM — I21.4 NSTEMI (NON-ST ELEVATED MYOCARDIAL INFARCTION): Status: RESOLVED | Noted: 2017-07-25 | Resolved: 2017-07-27

## 2017-07-27 PROBLEM — L89.152 DECUBITUS ULCER OF SACRAL REGION, STAGE 2: Status: ACTIVE | Noted: 2017-07-27

## 2017-07-27 PROBLEM — J96.01 ACUTE HYPOXEMIC RESPIRATORY FAILURE: Status: RESOLVED | Noted: 2017-07-25 | Resolved: 2017-07-27

## 2017-07-27 LAB
ANION GAP SERPL CALC-SCNC: 12 MMOL/L
BUN SERPL-MCNC: 61 MG/DL
CALCIUM SERPL-MCNC: 8.5 MG/DL
CHLORIDE SERPL-SCNC: 95 MMOL/L
CO2 SERPL-SCNC: 37 MMOL/L
CREAT SERPL-MCNC: 3.1 MG/DL
EST. GFR  (AFRICAN AMERICAN): 19 ML/MIN/1.73 M^2
EST. GFR  (NON AFRICAN AMERICAN): 17 ML/MIN/1.73 M^2
GLUCOSE SERPL-MCNC: 78 MG/DL
MAGNESIUM SERPL-MCNC: 2 MG/DL
PHOSPHATE SERPL-MCNC: 4.8 MG/DL
POTASSIUM SERPL-SCNC: 3.4 MMOL/L
SODIUM SERPL-SCNC: 144 MMOL/L

## 2017-07-27 PROCEDURE — G8996 SWALLOW CURRENT STATUS: HCPCS | Mod: CH

## 2017-07-27 PROCEDURE — 94761 N-INVAS EAR/PLS OXIMETRY MLT: CPT

## 2017-07-27 PROCEDURE — 63600175 PHARM REV CODE 636 W HCPCS: Performed by: HOSPITALIST

## 2017-07-27 PROCEDURE — 92526 ORAL FUNCTION THERAPY: CPT

## 2017-07-27 PROCEDURE — 25000003 PHARM REV CODE 250: Performed by: HOSPITALIST

## 2017-07-27 PROCEDURE — 83735 ASSAY OF MAGNESIUM: CPT

## 2017-07-27 PROCEDURE — 97110 THERAPEUTIC EXERCISES: CPT

## 2017-07-27 PROCEDURE — G8997 SWALLOW GOAL STATUS: HCPCS | Mod: CH

## 2017-07-27 PROCEDURE — G8998 SWALLOW D/C STATUS: HCPCS | Mod: CH

## 2017-07-27 PROCEDURE — 97530 THERAPEUTIC ACTIVITIES: CPT

## 2017-07-27 PROCEDURE — 84100 ASSAY OF PHOSPHORUS: CPT

## 2017-07-27 PROCEDURE — 27000221 HC OXYGEN, UP TO 24 HOURS

## 2017-07-27 PROCEDURE — 80048 BASIC METABOLIC PNL TOTAL CA: CPT

## 2017-07-27 PROCEDURE — 36415 COLL VENOUS BLD VENIPUNCTURE: CPT

## 2017-07-27 RX ORDER — FUROSEMIDE 40 MG/1
80 TABLET ORAL 2 TIMES DAILY
Qty: 60 TABLET | Refills: 11
Start: 2017-07-27 | End: 2018-07-27

## 2017-07-27 RX ADMIN — RISPERIDONE 0.5 MG: 0.5 TABLET ORAL at 09:07

## 2017-07-27 RX ADMIN — QUETIAPINE FUMARATE 12.5 MG: 25 TABLET ORAL at 06:07

## 2017-07-27 RX ADMIN — HEPARIN SODIUM 7500 UNITS: 5000 INJECTION, SOLUTION INTRAVENOUS; SUBCUTANEOUS at 06:07

## 2017-07-27 RX ADMIN — CARBIDOPA AND LEVODOPA 1 TABLET: 50; 200 TABLET, EXTENDED RELEASE ORAL at 06:07

## 2017-07-27 RX ADMIN — FUROSEMIDE 80 MG: 10 INJECTION, SOLUTION INTRAMUSCULAR; INTRAVENOUS at 09:07

## 2017-07-27 RX ADMIN — CARBIDOPA AND LEVODOPA 1 TABLET: 50; 200 TABLET, EXTENDED RELEASE ORAL at 02:07

## 2017-07-27 RX ADMIN — CLOPIDOGREL BISULFATE 75 MG: 75 TABLET ORAL at 09:07

## 2017-07-27 RX ADMIN — FINASTERIDE 5 MG: 5 TABLET, FILM COATED ORAL at 09:07

## 2017-07-27 RX ADMIN — TAMSULOSIN HYDROCHLORIDE 0.4 MG: 0.4 CAPSULE ORAL at 09:07

## 2017-07-27 RX ADMIN — LEVOTHYROXINE SODIUM 125 MCG: 125 TABLET ORAL at 06:07

## 2017-07-27 RX ADMIN — FAMOTIDINE 20 MG: 20 TABLET, FILM COATED ORAL at 09:07

## 2017-07-27 NOTE — PLAN OF CARE
Problem: Physical Therapy Goal  Goal: Physical Therapy Goal  Goals to be met by: 2017     Patient will increase functional independence with mobility by performin. Supine to sit with MInimal Assistance  2. Bed to chair transfer with Moderate Assistance using Rolling Walker  3. Sitting at edge of bed x20 minutes with Modified [place]     Outcome: Ongoing (interventions implemented as appropriate)  Continue working toward goals.

## 2017-07-27 NOTE — PHYSICIAN QUERY
PT Name: Eliecer Dickson  MR #: [id number]    Physician Query Form -Integumentary  Clarification     CDS/: Essence Hardy               Contact information:[email or web address]    This form is a permanent document in the medical record.     Query Date: July 27, 2017    By submitting this query, we are merely seeking further clarification of documentation. Please utilize your independent clinical judgment when addressing the question(s) below.    The Medical record contains the following:   Indicator  Supporting Clinical Findings Location in Medical Record    Redness     x Decubitus, Pressure Ulcer, etc. Pressure ulcer Left heel stage 1    Pressure Ulcer Right heel stage 1    Pressure ulcer sacrum stage 2       Adult PCS Flowsheet 7/26    Deep Tissue Injury      Wound Care Consult      Medication      Treatment      Other       National Pressure Ulcer Advisory Panel (2007) Pressure Ulcer Definitions & Stages:    Stage I:  Intact skin with non-blanchable redness of a localized area usually over a bony prominence.   Stage II:  Partial thickness loss of dermis presenting as a shallow open ulcer with a red pink wound bed, without slough.   Stage III: Full thickness tissue loss. Subcutaneous fat may be visible but bone, tendon or muscle is not exposed. Slough may be present but does not obscure the depth of tissue loss. May include undermining and tunneling.   Stage IV: Full thickness tissue loss with exposed bone, tendon or muscle. Slough or eschar may be present on some parts of the wound bed. Often include undermining and tunneling.   Unstageable:   Full thickness tissue loss in which the base of the ulcer is covered by slough and/or eschar in the wound bed. Until enough slough and/or eschar is removed to expose the base of the wound, the true depth, and therefore stage, cannot be determined.   Deep Tissue Injury: Purple or maroon localized area of discolored intact skin or blood-filled blister due to damage of  underlying soft tissue from  pressure and/or shear.     Provider, please specify the diagnosis or diagnoses associated with above clinical findings.      [  ] Decubitus (Pressure) Ulcer / Deep Tissue Injury (please specify site, laterality & stage)    Site Laterality Stage   [x  ]Heel [x  ] Right            [ x ]Left 1, 1   [ x ]Sacrum     2   [  ]Other Site (please specify):          [  ] Other Integumentary Diagnosis (please specify): ___________________________________  [  ] Clinically Undetermined    Please document in your progress notes daily for the duration of treatment until resolved and include in your discharge summary.

## 2017-07-27 NOTE — PLAN OF CARE
Report given to DHARMESH Awad at  [facility].  Nurse verbalized understanding.  PIV removed.  Pt tolerated well.  Tele removed.  Awaiting transporter to  pt to bring to  [facility].

## 2017-07-27 NOTE — PT/OT/SLP PROGRESS
Speech Language Pathology  Treatment    Eliecer Dickson   MRN: [id number]   Admitting Diagnosis: Acute on chronic diastolic congestive heart failure    Diet recommendations: Solid Diet Level: Regular  Liquid Diet Level: Thin   Upright for meals, whole meds, straws ok    SLP Treatment Date: 07/27/17  Speech Start Time: 1100     Speech Stop Time: 1114     Speech Total (min): 14 min       TREATMENT BILLABLE MINUTES:  Treatment Swallowing Dysfunction 14    Has the patient been evaluated by SLP for swallowing? : Yes  Keep patient NPO?: No   General Precautions: Standard, fall  Current Respiratory Status: nasal cannula       Subjective:  Pt seen at bedside, SLP did communicate with RN.     Pain/Comfort  Pain Rating 1: 0/10    Objective:   Patient found with: telemetry  Pt seen at bedside for direct dysphagia tx. Pt alert and attentive. Pt agreed to po trials with SLP: peaches, cracker and consecutive sips of liquid via straw. Oral phase: timely swallow, adequate mastication noted.  Pharyngeal phase -timely swallow palpated, no change in voice or coughing. Pt safe for continued oral diet.   Pt able to state swallow precautions to SLP w/ no issues.     FIM:  Social Interaction: 6 Complete [place]--The patient interacts appropriately with staff, other patients, and family members (e.g., controls temper, accepts criticism, is aware that words and actions have an impact on others), and does not require medication for         Comprehension: 6 Modified [place]--In most situations, the patient understands readily or with only mild dificulty complex or abstract directions and conversation.  The patient does not require prompting, though (s)he may require a hearing or visual aid, other x                Assessment:  Eliecer Dickson is a 89 y.o. male with a medical diagnosis of Acute on chronic diastolic congestive heart failure and presents with WFL oral and pharyngeal phase of the swallow.     Discharge recommendations:  Discharge Facility/Level Of Care Needs:  (tbd)     Goals:    SLP Goals     Not on file          Multidisciplinary Problems (Resolved)        Problem: SLP Goal    Goal Priority Disciplines Outcome   SLP Goal   (Resolved)     SLP Outcome(s) achieved   Description:  Short Term Goals:   1. Pt will tolerate a regular diet and thin liquids with no overt s/s of aspiration.   2. Pt will demonstrate/verbalize 3 safe swallowing precautions with min cues.                      Plan:   Patient to be seen    Plan of Care expires: 08/24/17  Plan of Care reviewed with: patient (RN)  SLP Follow-up?: No        SLP G-Codes  Functional Assessment Tool Used: NOMS  Score: 6  Functional Limitations: Swallowing  Swallow Current Status ():   Swallow Goal Status ():   Swallow Discharge Status ():     ANDRES Bernal, CCC-SLP  07/27/2017

## 2017-07-27 NOTE — PLAN OF CARE
Problem: Patient Care Overview  Goal: Plan of Care Review  Sats 95% 2L NC , will continue to monitor.

## 2017-07-27 NOTE — ASSESSMENT & PLAN NOTE
Acute hypoxemic respiratory failure  Severe mitral regurgitation  Pulmonary hypertension  Takes furosemide 40 mg daily at home.  Give furosemide 80 mg IV BID.  Can increase home dose on discharge.  Holding losartan 50 mg daily.  Monitor intake and output.  Monitor renal function and electrolytes.  Wean oxygen as tolerated.

## 2017-07-27 NOTE — PLAN OF CARE
Problem: SLP Goal  Goal: SLP Goal  Short Term Goals:   1. Pt will tolerate a regular diet and thin liquids with no overt s/s of aspiration.   2. Pt will demonstrate/verbalize 3 safe swallowing precautions with min cues.    Outcome: Outcome(s) achieved Date Met: 07/27/17  Pt tolerated regular diet and thin liquid trials with SLP. Pt did state swallow strategies to SLP with no cues.

## 2017-07-27 NOTE — ASSESSMENT & PLAN NOTE
Acute hypoxemic respiratory failure  Severe mitral regurgitation  Pulmonary hypertension  Furosemide was increased from 40 mg daily to 80 mg twice daily because of his poor renal function.  Losartan was discontinued and systolic blood pressure only reached 136 off of it.

## 2017-07-27 NOTE — ASSESSMENT & PLAN NOTE
Creatinine remained stable at 3.1 over 3 days while giving high dose furosemide.  He likely has chronic kidney disease stage 4, so a lower furosemide dose will not be effective.

## 2017-07-27 NOTE — PLAN OF CARE
Faxed discharge orders to [facility] and patient accepted back.   Gave packet to nurse to call report.  Left message on [facility]CPOA voicemail to let him know patient returning to [facility].       07/27/17 1431   Final Note   Assessment Type Final Discharge Note   Discharge Disposition assisted Nu   Discharge planning education complete? Yes   Hospital Follow Up  Appt(s) scheduled? No   Discharge plans and expectations educations in teach back method with documentation complete? No   Offered [facility] -- Bedside Delivery? n/a   Discharge/Hospital Encounter Summary to ([facility]) PCP No   Referral to Outpatient Case Management complete? No   Referral to / orders for Home Health Complete? No   30 day supply of medicines given at discharge, if documented non-compliance / non-adherence? No   Any social issues identified prior to discharge? No   Did you assess the readiness or willingness of the family or caregiver to support self management of care? No   Right Care Referral Info   Post Acute Recommendation Other   [facility]   [facility] Name [facility]     Lisa Mujica, RN, CCM, CMSRN  RN Transition Navigator  [phone]

## 2017-07-27 NOTE — PLAN OF CARE
Oxygen saturation normal on 0.5 liters nasal cannula.  Most likely does not need supplemental oxygen at all at this time.

## 2017-07-27 NOTE — DISCHARGE SUMMARY
[facility] Hospital Medicine  Discharge Summary      Patient Name: Eliecer Dickson  MRN: [id number]  Admission Date: 7/25/2017  Hospital Length of Stay: 2 days  Discharge Date and Time: 7/27/2017  4:15 PM  Attending Physician: Matthew Ma MD   Discharging Provider: Matthew Ma MD  Primary Care Provider: To Trujillo MD      HPI:   Eliecer Dickson is a 89 y.o. paranoid schizophrenia, coronary artery disease, severe mitral regurgitation, aortic stenosis, heart failure with preserved ejection fraction, right bundle branch block, hypothyroidism, Parkinson disease, history of left hip fracture status post repair, benign prostatic hyperplasia, hypothyroidism, osteoarthritis, and lactose intolerance.  He lives in [facility] in [place], [place].  He has a fluid restriction (dietary gives 1260 mL, nursing gives 240 mL).  He is full code.  He was in the [employer] and [place] II.  His primary care physician is Dr. To Izquierdo.    He was sent to [facility] ED on 7/25/17 after the nurse practitioner felt he was having a heart failure exacerbation.  He had bilateral pitting edema, cough, and orthopnea.  He typically takes furosemide 40 mg daily.  In the ED he was found to have hypoxia, tachypnea, relative hypotension (90s/40s), elevated troponin (0.359), evidence of heart failure exacerbation with elevated BNP (1621), prominent pulmonary vasculature and perihilar ground glass opacities on chest x-ray, and acute kidney injury (BUN 40, creatinine 3.1).  He was admitted to [facility].     Indwelling Lines/Drains at time of discharge:   Lines/Drains/Airways     Pressure Ulcer                 Pressure Ulcer 07/25/17 1749 sacral spine Stage II 1 day         Pressure Ulcer 07/25/17 1757 Right heel Stage I 1 day         Pressure Ulcer 07/25/17 1900 Left heel Stage I 1 day              Hospital Course:   He was  put on furosemide 80 mg IV twice daily.  Losartan was stopped.  Renal ultrasound showed an enlarged prostate and medical renal disease but no obstruction.  Leg edema improved and he remained asymptomatic the next 2 days.  Creatinine remained stable at 3.1 while diuresing, which probably indicates that he developed chronic kidney disease in the 6 months since his last labs.  Hypoxia improved.  He was noted to have a stage 2 sacral decubitus ulcer and stage 1 bilateral heel ulcers.  He was discharged back to the [facility] on 7/27/17.  His home furosemide was increased from 40 mg daily to 80 mg twice daily.  Losartan was discontinued.     Consults:   Consults         Status Ordering Provider     IP consult to case management  Once     Provider:  (Not yet assigned)    Completed KRISHNA JESSICA     IP consult to dietary  Once     Provider:  (Not yet assigned)    Completed KRISHNA JESSICA          Significant Diagnostic Studies:   EKG 7/25/17: Normal sinus rhythm, right bundle branch block  X-Ray Chest AP Portable 7/25/17: The mediastinal structures are midline.  The cardiac silhouette is difficult to evaluate due to AP technique.  There is atherosclerotic calcification of the aortic arch.  Ossification projecting over the medial right lung apex are stable and correspond to thyroid calcifications seen on prior CT.  There is prominence of central pulmonary vessels and perihilar groundglass opacities suggestive of edema.  There is osteopenia and degenerative change of the shoulders.  US Lower Extremity Veins Bilateral 7/25/17: The deep veins of the bilateral lower extremity demonstrate normal color flow and compressibility.  No fluid collections identified.  The common femoral veins demonstrates normal phasicity of waveform.  No evidence of significant venous reflux.  2D echo with color flow doppler 7/25/17:     1 - Normal left ventricular systolic function (EF 55-60%).     2 - Impaired LV relaxation, elevated LAP  (grade 2 diastolic dysfunction).     3 - Pulmonary hypertension. The estimated PA systolic pressure is 54 mmHg.     4 - Moderate aortic stenosis, LAURA = 1.15 cm2, peak velocity = 2.95 m/s, mean gradient = 20 mmHg.     5 - Moderate aortic regurgitation.     6 - Moderate mitral regurgitation.     7 - Increased central venous pressure.   US Retroperitoneal Complete 7/25/17: Right kidney measures 10.3 x 4.6 x 4.9 cm left kidney measures 11.0 x 5.1 x 8.4 cm.  No hydronephrosis, nephrolithiasis or solid renal masses.  The cortical thickness is diminished.  Corticomedullary differentiation is also diminished.  Flow to the kidneys is decreased.  Resistive indices are within normal limits of 0.62 on the right and 0.50 on the left.  Urinary bladder is unremarkable.  Prostate gland is enlarged.  No ascites.  Impression:  1.  Findings suggestive medical renal disease.  No hydronephrosis.  2.  Enlarged prostate gland.    Final Active Diagnoses:    Diagnosis Date Noted POA    PRINCIPAL PROBLEM:  Acute on chronic diastolic congestive heart failure [I50.33] 07/25/2017 Yes    Decubitus ulcer of sacral region, stage 2 [L89.152] 07/27/2017 Yes    Acute kidney injury [N17.9] 07/25/2017 Yes    Benign non-nodular prostatic hyperplasia with lower urinary tract symptoms [N40.1] 03/31/2017 Not Applicable     Chronic    Nursing home resident [Z59.3] 12/28/2016 Not Applicable     Chronic    Coronary artery disease involving native coronary artery of native heart [I25.10] 07/03/2014 Yes     Chronic    Severe mitral regurgitation [I34.0] 11/21/2013 Yes     Chronic    Pulmonary hypertension [I27.2] 11/21/2013 Yes     Chronic    Paranoid schizophrenia, chronic condition [F20.0] 11/10/2013 Yes     Chronic    Parkinson disease [G20] 11/06/2013 Yes     Chronic    Acquired hypothyroidism [E03.9] 11/06/2013 Yes     Chronic      Problems Resolved During this Admission:    Diagnosis Date Noted Date Resolved POA    Acute hypoxemic respiratory  failure [J96.01] 07/25/2017 07/27/2017 Yes    NSTEMI (non-ST elevated myocardial infarction) [I21.4] 07/25/2017 07/27/2017 Yes      * Acute on chronic diastolic congestive heart failure    Acute hypoxemic respiratory failure  Severe mitral regurgitation  Pulmonary hypertension  Furosemide was increased from 40 mg daily to 80 mg twice daily because of his poor renal function.  Losartan was discontinued and systolic blood pressure only reached 136 off of it.            Acute kidney injury    Creatinine remained stable at 3.1 over 3 days while giving high dose furosemide.  He likely has chronic kidney disease stage 4, so a lower furosemide dose will not be effective.            Discharged Condition: good    Disposition: Long Term Care - [facility] ([address], [place] [address])     Follow Up:  Follow-up Information     To Trujillo MD.    Specialty:  Family Medicine  Contact information:  [address] [place] [id number] [phone]                 Patient Instructions:     AIR MATTRESS if Nitin Scale less than 15     Diet Adult Regular   Order Specific Question Answer Comments   Additional restrictions: Cardiac (Low Na/Chol)      Vital signs per facility protocol     Skin assessment every shift      Ambulate with assistance     Up in chair/ wheel chair     Turn patient every 2 hours to prevent pressure ulcer formation     Full code     Aspiration precautions       Medications:  Reconciled Home Medications:   Current Discharge Medication List      CONTINUE these medications which have CHANGED    Details   furosemide (LASIX) 40 MG tablet Take 2 tablets (80 mg total) by mouth 2 (two) times daily.  Qty: 60 tablet, Refills: 11         CONTINUE these medications which have NOT CHANGED    Details   alprazolam (XANAX) 0.25 MG tablet Take 0.25 mg by mouth 4 (four) times daily as needed.      aspirin 81 MG Chew Take 81 mg by mouth once daily.      carbidopa-levodopa  mg  (SINEMET CR)  mg TbSR Take 1 tablet by mouth 3 (three) times daily.      clopidogrel (PLAVIX) 75 mg tablet Take 75 mg by mouth once daily.      D-METHORPHAN/PE/ACETAMINOPHEN (ROBITUSSIN COLD-FLU DAY ORAL) Take 30 mLs by mouth 2 (two) times daily as needed (cough).       docusate sodium (COLACE) 100 MG capsule Take 100 mg by mouth nightly as needed for Constipation.      donepezil (ARICEPT) 10 MG tablet Take 10 mg by mouth every evening.      finasteride (PROSCAR) 5 mg tablet Take 5 mg by mouth once daily.      fish oil-omega-3 fatty acids 300-1,000 mg capsule Take 2 g by mouth once daily.      fluticasone (FLONASE) 50 mcg/actuation nasal spray 1 spray by Each Nare route once daily.      FOLIC ACID/MULTIVITS-MIN/LUT (CENTRUM SILVER ORAL) Take by mouth once daily.       HISTAMINE DIHYDROCHLORIDE (HISTAMINE DIHYDRO-CL, BULK, MISC) Apply 0.025 % topically 2 (two) times daily as needed (arthritis pain).      isosorbide mononitrate (IMDUR) 30 MG 24 hr tablet Take 30 mg by mouth once daily.      lactase (LACTAID) 3,000 unit tablet Take 2 tablets by mouth 2 (two) times daily with meals.      levothyroxine (SYNTHROID) 125 MCG tablet Take 125 mcg by mouth once daily.      loratadine (CLARITIN) 10 mg tablet Take 10 mg by mouth once daily.      lorazepam (ATIVAN) 0.5 MG tablet Take 0.5 mg by mouth every 6 (six) hours as needed for Anxiety.      metoprolol tartrate (LOPRESSOR) 25 MG tablet Take 75 mg by mouth 2 (two) times daily. Take 3 tablets for a total of 75 mg twice a day.       potassium chloride SA (K-DUR,KLOR-CON) 20 MEQ tablet Take 20 mEq by mouth once daily.      !! quetiapine (SEROQUEL) 25 MG Tab Take 12.5 mg by mouth 2 (two) times daily.       !! quetiapine (SEROQUEL) 50 MG tablet Take 50 mg by mouth every evening.      ranitidine (ZANTAC) 150 MG tablet Take 150 mg by mouth 2 (two) times daily.      risperidone (RISPERDAL) 0.5 MG Tab Take 0.5 mg by mouth 2 (two) times daily.      senna-docusate 8.6-50 mg (SENNA  WITH DOCUSATE SODIUM) 8.6-50 mg per tablet Take 2 tablets by mouth nightly as needed.      simvastatin (ZOCOR) 40 MG tablet Take 40 mg by mouth every evening.      tamsulosin (FLOMAX) 0.4 mg Cp24 Take 0.4 mg by mouth once daily.      tramadol (ULTRAM) 50 mg tablet Take 50 mg by mouth 2 (two) times daily.      zinc oxide (BOUDREAUXS BUTT PASTE) 16 % Oint ointment Apply topically daily as needed (to buttocks for itching, burning, and redness).       !! - Potential duplicate medications found. Please discuss with provider.      STOP taking these medications       losartan (COZAAR) 50 MG tablet Comments:   Reason for Stopping:             Time spent on the discharge of patient: 35 minutes    Matthew Ma MD  Department of Hospital Medicine  [facility]

## 2017-07-27 NOTE — PT/OT/SLP PROGRESS
Physical Therapy  Treatment    Eliecer Dickson   MRN: [id number]   Admitting Diagnosis: Acute on chronic diastolic congestive heart failure    PT Received On: 07/27/17  PT Start Time: 1145     PT Stop Time: 1213    PT Total Time (min): 28 min       Billable Minutes:  Therapeutic Activity 13 and Therapeutic Exercise 14    Treatment Type: Treatment  PT/PTA: PTA     PTA Visit Number: 1       General Precautions: Standard, fall  Orthopedic Precautions: N/A   Braces:           Subjective:  Communicated with RN (Ashley) prior to session.  Pt agreed to tx.    Pain/Comfort  Pain Rating 1: 0/10  Pain Rating Post-Intervention 1: 0/10    Objective:   Patient found with: telemetry    Functional Mobility:  Bed Mobility:   Rolling/Turning to Left: Minimum assistance (with b/r and vc's, increased time to perform)  Rolling/Turning Right: Minimum assistance (vc's and increased time to perform with b/r for assist)  Scooting/Bridging: Contact Guard Assistance (to EOB)  Supine to Sit: Moderate Assistance  Sit to Supine: Maximum Assistance    Transfers:       Gait:        Stairs:    Balance:   Static Sit: FAIR+: Able to take MINIMAL challenges from all directions  Dynamic Sit: FAIR+: Maintains balance through MINIMAL excursions of active trunk motion    Therapeutic Activities and Exercises:  Static sitting EOB with SBA once positioned.  Dynamic sitting EOB performing BLE therex with SBA/CGA.  Pt sat EOB x ~10 with SBA/CGA  Seated BLE therex: AP with assistance on L and LAQs x 15 reps.  Supine BLE therex: heelslides (Romy), hip ABD/ADD/IR/ER(Romy), and glut sets all 10 reps x 2   Pt's toenail on fourth toe, right foot, started to bleed when pt was in sitting.  Pt was returned supine and nsg called into room.  Nsg bandaged toe and bleeding stopped.    AM-PAC 6 CLICK MOBILITY  How much help from another person does this patient currently need?   1 = Unable, Total/Dependent Assistance  2 = A lot, Maximum/Moderate Assistance  3 = A little,  Minimum/Contact Guard/Supervision  4 = None, Modified [place]/Independent    Turning over in bed (including adjusting bedclothes, sheets and blankets)?: 3  Sitting down on and standing up from a chair with arms (e.g., wheelchair, bedside commode, etc.): 2  Moving from lying on back to sitting on the side of the bed?: 2  Moving to and from a bed to a chair (including a wheelchair)?: 2  Need to walk in hospital room?: 1  Climbing 3-5 steps with a railing?: 1  Total Score: 11    AM-PAC Raw Score CMS G-Code Modifier Level of Impairment Assistance   6 % Total / Unable   7 - 9 CM 80 - 100% Maximal Assist   10 - 14 CL 60 - 80% Moderate Assist   15 - 19 CK 40 - 60% Moderate Assist   20 - 22 CJ 20 - 40% Minimal Assist   23 CI 1-20% SBA / CGA   24 CH 0% Independent/ Mod I     Patient left supine with all lines intact, call button in reach, bed alarm on and RN notified.    Assessment:  Eliecer Dickson is a 89 y.o. male with a medical diagnosis of Acute on chronic diastolic congestive heart failure and presents with decreased strength, endurance, mobility, and ROM.  Pt would continue to benefit from P.T. To address impairments listed below.    Rehab identified problem list/impairments: Rehab identified problem list/impairments: weakness, impaired endurance, impaired self care skills, impaired functional mobilty, impaired balance, impaired cognition, decreased lower extremity function, decreased upper extremity function, decreased ROM    Rehab potential is fair.    Activity tolerance: Fair    Discharge recommendations: Discharge Facility/Level Of Care Needs:  (return to [facility])     Barriers to discharge: Barriers to Discharge: None    Equipment recommendations: Equipment Needed After Discharge: none     GOALS:    Physical Therapy Goals        Problem: Physical Therapy Goal    Goal Priority Disciplines Outcome Goal Variances Interventions   Physical Therapy Goal     PT/OT, PT Ongoing (interventions implemented  as appropriate)     Description:  Goals to be met by: 2017     Patient will increase functional independence with mobility by performin. Supine to sit with MInimal Assistance  2. Bed to chair transfer with Moderate Assistance using Rolling Walker  3. Sitting at edge of bed x20 minutes with Modified [place]                       PLAN:    Patient to be seen 3 x/week  to address the above listed problems via gait training, therapeutic activities, therapeutic exercises  Plan of Care expires: 17  Plan of Care reviewed with: patient         Antonette Irvin, ASHLEY  2017

## 2017-07-27 NOTE — PLAN OF CARE
[facility]  Luis Painter MD, [facility]     MD Viri Dunlap FNP Renee Melancon, PA-C Rosanne Zeringue, NP  [address] [place] [address]  Office: [phone]  Fax: [phone]       NURSING HOME ORDERS    Patient Name: Eliecer Dickson  YOB: 1926 07/27/2017    Admit to Nursing Home:  Regular Bed     [facility] ([address], [place] [address])     Diagnoses:  Active Hospital Problems    Diagnosis  POA    *Acute on chronic diastolic congestive heart failure [I50.33]  Yes     Priority: 1 - High    Acute kidney injury [N17.9]  Yes    Benign non-nodular prostatic hyperplasia with lower urinary tract symptoms [N40.1]  Not Applicable     Chronic    Nursing home resident [Z59.3]  Not Applicable     Chronic     [facility] ([address], [place] [address])       Coronary artery disease involving native coronary artery of native heart [I25.10]  Yes     Chronic     D. SUMMARY:    1. Two vessel coronary artery disease.  2. Severely calcified ostial LAD and mid RCA  3. Unable to dilated mid RCA lesion   4. No LV gram performed due to renal insufficiency    Intervention 7/15/2014      1. Successful PCI.  2. Orbital atherectomy of ostial and proximal LAD with 1.25 mm CSI Diamondback crown  3. IVUS PCI of ostial LAD with 3.5 x 33 mm Xience post dilated with 3.5 x 15 mm NC balloon  4. Complex coronary intervention due to heavy calcifation of ostial LAD at the LCX take off      Severe mitral regurgitation [I34.0]  Yes     Chronic    Pulmonary hypertension [I27.2]  Yes     Chronic     PASP 63 mmHg on echo in 2014      Paranoid schizophrenia, chronic condition [F20.0]  Yes     Chronic    Parkinson disease [G20]  Yes     Chronic    Acquired hypothyroidism [E03.9]  Yes     Chronic      Resolved Hospital Problems    Diagnosis Date Resolved POA    Acute hypoxemic  respiratory failure [J96.01] 07/27/2017 Yes     Priority: 2     NSTEMI (non-ST elevated myocardial infarction) [I21.4] 07/27/2017 Yes     Allergies:Review of patient's allergies indicates:  No Known Allergies      Discharge Procedure Orders  AIR MATTRESS if Nitin Scale less than 15     Diet Adult Regular   Order Specific Question Answer Comments   Additional restrictions: Cardiac (Low Na/Chol)      Vital signs per facility protocol     Skin assessment every shift      Ambulate with assistance     Up in chair/ wheel chair     Turn patient every 2 hours to prevent pressure ulcer formation     Full code     Aspiration precautions       LABS:  Per facility protocol    Nursing Precautions:     - Aspiration precautions:             -  Upright 90 degrees befor during and after meals     - Fall precautions per nursing home protocol   - Decubitus precautions:        -  for positioning   - Pressure reducing foam mattress   - Turn patient every two hours. Use wedge pillows to anchor patient    MISCELLANEOUS CARE:    Routine Skin for Bedridden Patients:  Apply moisture barrier cream to all    skin folds and wet areas in perineal area daily and after baths and                           all bowel movements.    Wound Care: Bilateral heel decubitus ulcers - float heels when in bed.  Sacral decubitus ulcer stage 2 - mepilex daily.    Medications: Discontinue all previous medication orders, if any. See new list below.   Eliecer Dickson   Home Medication Instructions BEAR:[id number]    Printed on:07/27/17 1312   Medication Information                      alprazolam (XANAX) 0.25 MG tablet  Take 0.25 mg by mouth 4 (four) times daily as needed.             aspirin 81 MG Chew  Take 81 mg by mouth once daily.             carbidopa-levodopa  mg (SINEMET CR)  mg TbSR  Take 1 tablet by mouth 3 (three) times daily.             clopidogrel (PLAVIX) 75 mg tablet  Take 75 mg by mouth once daily.              D-METHORPHAN/PE/ACETAMINOPHEN (ROBITUSSIN COLD-FLU DAY ORAL)  Take 30 mLs by mouth 2 (two) times daily as needed (cough).              docusate sodium (COLACE) 100 MG capsule  Take 100 mg by mouth nightly as needed for Constipation.             donepezil (ARICEPT) 10 MG tablet  Take 10 mg by mouth every evening.             finasteride (PROSCAR) 5 mg tablet  Take 5 mg by mouth once daily.             fish oil-omega-3 fatty acids 300-1,000 mg capsule  Take 2 g by mouth once daily.             fluticasone (FLONASE) 50 mcg/actuation nasal spray  1 spray by Each Nare route once daily.             FOLIC ACID/MULTIVITS-MIN/LUT (CENTRUM SILVER ORAL)  Take by mouth once daily.              furosemide (LASIX) 40 MG tablet  Take 2 tablets (80 mg total) by mouth 2 (two) times daily.  Increased from 40 mg daily.           HISTAMINE DIHYDROCHLORIDE (HISTAMINE DIHYDRO-CL, BULK, MISC)  Apply 0.025 % topically 2 (two) times daily as needed (arthritis pain).             isosorbide mononitrate (IMDUR) 30 MG 24 hr tablet  Take 30 mg by mouth once daily.             lactase (LACTAID) 3,000 unit tablet  Take 2 tablets by mouth 2 (two) times daily with meals.             levothyroxine (SYNTHROID) 125 MCG tablet  Take 125 mcg by mouth once daily.             loratadine (CLARITIN) 10 mg tablet  Take 10 mg by mouth once daily.             lorazepam (ATIVAN) 0.5 MG tablet  Take 0.5 mg by mouth every 6 (six) hours as needed for Anxiety.             metoprolol tartrate (LOPRESSOR) 25 MG tablet  Take 75 mg by mouth 2 (two) times daily. Take 3 tablets for a total of 75 mg twice a day.              potassium chloride SA (K-DUR,KLOR-CON) 20 MEQ tablet  Take 20 mEq by mouth once daily.             quetiapine (SEROQUEL) 25 MG Tab  Take 12.5 mg by mouth 2 (two) times daily.              quetiapine (SEROQUEL) 50 MG tablet  Take 50 mg by mouth every evening.             ranitidine (ZANTAC) 150 MG tablet  Take 150 mg by mouth 2 (two) times daily.              risperidone (RISPERDAL) 0.5 MG Tab  Take 0.5 mg by mouth 2 (two) times daily.             senna-docusate 8.6-50 mg (SENNA WITH DOCUSATE SODIUM) 8.6-50 mg per tablet  Take 2 tablets by mouth nightly as needed.             simvastatin (ZOCOR) 40 MG tablet  Take 40 mg by mouth every evening.             tamsulosin (FLOMAX) 0.4 mg Cp24  Take 0.4 mg by mouth once daily.             tramadol (ULTRAM) 50 mg tablet  Take 50 mg by mouth 2 (two) times daily.             zinc oxide (BOUDREAUXS BUTT PASTE) 16 % Oint ointment  Apply topically daily as needed (to buttocks for itching, burning, and redness).                       _________________________________  Matthew Ma MD  07/27/2017

## 2017-07-27 NOTE — SUBJECTIVE & OBJECTIVE
Interval History: Not feeling short of breath on 2 liters nasal cannula.  Oxygen decreased to 0.5 liters.    Review of Systems   Constitutional: Negative for chills and fever.   Respiratory: Negative for cough and shortness of breath.      Objective:     Vital Signs (Most Recent):  Temp: 97.5 °F (36.4 °C) (07/27/17 0800)  Pulse: 93 (07/27/17 0800)  Resp: 18 (07/27/17 0800)  BP: (!) 136/59 (07/27/17 0800)  SpO2: (!) 92 % (07/27/17 1002) Vital Signs (24h Range):  Temp:  [97.1 °F (36.2 °C)-98.1 °F (36.7 °C)] 97.5 °F (36.4 °C)  Pulse:  [] 93  Resp:  [17-19] 18  SpO2:  [92 %-97 %] 92 %  BP: ()/(53-87) 136/59     Weight: 81.6 kg (179 lb 14.3 oz)  Body mass index is 29.04 kg/m².    Intake/Output Summary (Last 24 hours) at 07/27/17 1106  Last data filed at 07/27/17 0400   Gross per 24 hour   Intake              125 ml   Output             1966 ml   Net            -1841 ml      Physical Exam   Constitutional: He is oriented to person, place, and time. He appears well-developed. No distress.   Cardiovascular: Normal rate.    Murmur heard.   Systolic murmur is present   Pulmonary/Chest: Effort normal. No respiratory distress.   Musculoskeletal:   Leg edema improved   Neurological: He is alert and oriented to person, place, and time.   Nursing note and vitals reviewed.      Significant Labs:   CMP:     Recent Labs  Lab 07/25/17  1208 07/26/17  0516 07/27/17  0621    141 144   K 3.6 3.8 3.4*   CL 97 97 95   CO2 35* 33* 37*   GLU 90 110 78   BUN 40* 51* 61*   CREATININE 3.1* 3.1* 3.1*   CALCIUM 8.2* 8.1* 8.5*   PROT 6.0  --   --    ALBUMIN 2.8*  --   --    BILITOT 0.5  --   --    ALKPHOS 69  --   --    AST 9*  --   --    ALT <5*  --   --    ANIONGAP 9 11 12   EGFRNONAA 17* 17* 17*     All pertinent labs within the past 24 hours have been reviewed.    Significant Imaging: I have reviewed all pertinent imaging results/findings within the past 24 hours.

## 2017-07-27 NOTE — PROGRESS NOTES
[facility]  Progress Note    Patient Name: Eliecer Dickson  MRN: [id number]  Patient Class: IP- Inpatient   Admission Date: 7/25/2017  Length of Stay: 2 days  Attending Physician: Matthew Ma MD  Primary Care Provider: To Trujillo MD        Subjective:     Principal Problem:Acute on chronic diastolic congestive heart failure    HPI:  Eliecer Dickson is a 89 y.o. paranoid schizophrenia, coronary artery disease, severe mitral regurgitation, aortic stenosis, heart failure with preserved ejection fraction, right bundle branch block, hypothyroidism, Parkinson disease, history of left hip fracture status post repair, benign prostatic hyperplasia, hypothyroidism, osteoarthritis, and lactose intolerance.  He lives in [facility] in [place], [place].  He has a fluid restriction (dietary gives 1260 mL, nursing gives 240 mL).  He is full code.  He was in the [employer] and [place] II.  His primary care physician is Dr. To Izquierdo.    He was sent to [facility] ED on 7/25/17 after the nurse practitioner felt he was having a heart failure exacerbation.  He had bilateral pitting edema, cough, and orthopnea.  He typically takes furosemide 40 mg daily.  In the ED he was found to have hypoxia, tachypnea, relative hypotension (90s/40s), elevated troponin (0.359), evidence of heart failure exacerbation with elevated BNP (1621), prominent pulmonary vasculature and perihilar ground glass opacities on chest x-ray, and acute kidney injury (BUN 40, creatinine 3.1).  He was [facility] to [facility].    Hospital Course:  He was put on furosemide 80 mg IV twice daily.  Renal ultrasound showed an enlarged prostate and medical renal disease but no obstruction.  Creatinine remained stable at 3.1 while diuresing.      Interval History: Not feeling short of breath on 2 liters nasal cannula.  Oxygen decreased  to 0.5 liters.    Review of Systems   Constitutional: Negative for chills and fever.   Respiratory: Negative for cough and shortness of breath.      Objective:     Vital Signs (Most Recent):  Temp: 97.5 °F (36.4 °C) (07/27/17 0800)  Pulse: 93 (07/27/17 0800)  Resp: 18 (07/27/17 0800)  BP: (!) 136/59 (07/27/17 0800)  SpO2: (!) 92 % (07/27/17 1002) Vital Signs (24h Range):  Temp:  [97.1 °F (36.2 °C)-98.1 °F (36.7 °C)] 97.5 °F (36.4 °C)  Pulse:  [] 93  Resp:  [17-19] 18  SpO2:  [92 %-97 %] 92 %  BP: ()/(53-87) 136/59     Weight: 81.6 kg (179 lb 14.3 oz)  Body mass index is 29.04 kg/m².    Intake/Output Summary (Last 24 hours) at 07/27/17 1106  Last data filed at 07/27/17 0400   Gross per 24 hour   Intake              125 ml   Output             1966 ml   Net            -1841 ml      Physical Exam   Constitutional: He is oriented to person, place, and time. He appears well-developed. No distress.   Cardiovascular: Normal rate.    Murmur heard.   Systolic murmur is present   Pulmonary/Chest: Effort normal. No respiratory distress.   Musculoskeletal:   Leg edema improved   Neurological: He is alert and oriented to person, place, and time.   Nursing note and vitals reviewed.      Significant Labs:   CMP:     Recent Labs  Lab 07/25/17  1208 07/26/17  0516 07/27/17  0621    141 144   K 3.6 3.8 3.4*   CL 97 97 95   CO2 35* 33* 37*   GLU 90 110 78   BUN 40* 51* 61*   CREATININE 3.1* 3.1* 3.1*   CALCIUM 8.2* 8.1* 8.5*   PROT 6.0  --   --    ALBUMIN 2.8*  --   --    BILITOT 0.5  --   --    ALKPHOS 69  --   --    AST 9*  --   --    ALT <5*  --   --    ANIONGAP 9 11 12   EGFRNONAA 17* 17* 17*     All pertinent labs within the past 24 hours have been reviewed.    Significant Imaging: I have reviewed all pertinent imaging results/findings within the past 24 hours.     Assessment/Plan:      * Acute on chronic diastolic congestive heart failure    Acute hypoxemic respiratory failure  Severe mitral  regurgitation  Pulmonary hypertension  Takes furosemide 40 mg daily at home.  Give furosemide 80 mg IV BID.  Can increase home dose on discharge.  Holding losartan 50 mg daily.  Monitor intake and output.  Monitor renal function and electrolytes.  Wean oxygen as tolerated.            Acute kidney injury    Holding losartan.  May have developed progressive renal failure since last admission.  Creatinine stable since admission.        Benign non-nodular prostatic hyperplasia with lower urinary tract symptoms    Continue tamsulosin 0.4 mg daily, finasteride 5 mg daily.          Coronary artery disease involving native coronary artery of native heart    Continue aspirin 81 mg daily, clopidrogel 75 mg daily, simvastatin 40 mg HS.  Hold isosorbide mononitrate 30 mg daily.          Paranoid schizophrenia, chronic condition    Continue quetiapine 12.5 mg BID and 50 mg nightly, risperidone 0.5 mg BID, lorazepam 0.5 mg q6h PRN.  Will hold quetiapine if hypotension continues to be an issue.          Acquired hypothyroidism    Continue levothyroxine 125 mcg daily.          Parkinson disease    Continue carbidopa-levodopa  mg TID.  Fall precautions.            VTE Risk Mitigation         Ordered     heparin (porcine) injection 7,500 Units  Every 8 hours     Route:  Subcutaneous        07/25/17 1749     Medium Risk of VTE  Once      07/25/17 1749        Disposition: Return to [facility] when oxygen weaned, later today or tomorrow.    Matthew Ma MD  Department of Hospital Medicine   [facility]

## 2017-07-28 NOTE — PT/OT/SLP DISCHARGE
Physical Therapy Discharge Summary    Eliecer Dickson  MRN: [id number]   Acute on chronic diastolic congestive heart failure   Patient Discharged from acute Physical Therapy on 17.  Please refer to prior PT noted date on 17 for functional status.     Assessment:   Patient appropriate for care in another setting. Patient has not met goals.  GOALS:    Physical Therapy Goals        Problem: Physical Therapy Goal    Goal Priority Disciplines Outcome Goal Variances Interventions   Physical Therapy Goal     PT/OT, PT Ongoing (interventions implemented as appropriate)     Description:  Goals to be met by: 2017     Patient will increase functional independence with mobility by performin. Supine to sit with MInimal Assistance  2. Bed to chair transfer with Moderate Assistance using Rolling Walker  3. Sitting at edge of bed x20 minutes with Modified [place]                     Reasons for Discontinuation of Therapy Services  Transfer to alternate level of care.      Plan:  Patient Discharged to: [facility].

## 2017-07-31 ENCOUNTER — OUTSIDE PLACE OF SERVICE (OUTPATIENT)
Dept: ADMINISTRATIVE | Facility: OTHER | Age: 82
End: 2017-07-31

## 2017-08-16 ENCOUNTER — OUTSIDE PLACE OF SERVICE (OUTPATIENT)
Dept: ADMINISTRATIVE | Facility: OTHER | Age: 82
End: 2017-08-16

## 2017-09-26 ENCOUNTER — OFFICE VISIT (OUTPATIENT)
Dept: UROLOGY | Facility: CLINIC | Age: 82
End: 2017-09-26
Payer: MEDICARE

## 2017-09-26 VITALS
DIASTOLIC BLOOD PRESSURE: 78 MMHG | WEIGHT: 179 LBS | HEIGHT: 66 IN | BODY MASS INDEX: 28.77 KG/M2 | SYSTOLIC BLOOD PRESSURE: 132 MMHG

## 2017-09-26 DIAGNOSIS — R35.1 NOCTURIA: ICD-10-CM

## 2017-09-26 DIAGNOSIS — R35.0 URINARY FREQUENCY: ICD-10-CM

## 2017-09-26 DIAGNOSIS — R97.20 ELEVATED PSA, LESS THAN 10 NG/ML: ICD-10-CM

## 2017-09-26 DIAGNOSIS — N40.1 BENIGN NON-NODULAR PROSTATIC HYPERPLASIA WITH LOWER URINARY TRACT SYMPTOMS: Primary | Chronic | ICD-10-CM

## 2017-09-26 DIAGNOSIS — N50.819 EPIDIDYMAL PAIN: ICD-10-CM

## 2017-09-26 PROCEDURE — 99999 PR PBB SHADOW E&M-EST. PATIENT-LVL II: CPT | Mod: PBBFAC,,, | Performed by: UROLOGY

## 2017-09-26 PROCEDURE — 99499 UNLISTED E&M SERVICE: CPT | Mod: S$GLB,,, | Performed by: UROLOGY

## 2017-09-26 PROCEDURE — 3008F BODY MASS INDEX DOCD: CPT | Mod: S$GLB,,, | Performed by: UROLOGY

## 2017-09-26 PROCEDURE — 99213 OFFICE O/P EST LOW 20 MIN: CPT | Mod: S$GLB,,, | Performed by: UROLOGY

## 2017-09-26 PROCEDURE — 1125F AMNT PAIN NOTED PAIN PRSNT: CPT | Mod: S$GLB,,, | Performed by: UROLOGY

## 2017-09-26 PROCEDURE — 1159F MED LIST DOCD IN RCRD: CPT | Mod: S$GLB,,, | Performed by: UROLOGY

## 2017-09-26 NOTE — PROGRESS NOTES
Subjective:       Patient ID: Eliecer Dickson is a 89 y.o. male.    Chief Complaint: Annual Exam (6 mos check up); Nocturia; and Benign Prostatic Hypertrophy    89 year old WM from [facility] in [place], [place]. Pt developed an elevation of PSA (6.8). BPH with LUTS. Left Groin discomfort not worse with tactile stimulation. BPH, Nocturia x 1. Frequency q 3 hr, using diaper. Pt wit parkinson's disease as well.      Benign Prostatic Hypertrophy   This is a chronic problem. The current episode started more than 1 year ago. The problem has been waxing and waning since onset. Irritative symptoms include frequency and nocturia. Irritative symptoms do not include urgency. Obstructive symptoms include incomplete emptying (pt unsure), a slower stream and a weak stream. Obstructive symptoms do not include dribbling, an intermittent stream or straining. Associated symptoms include hesitancy. Pertinent negatives include no chills, dysuria, genital pain, hematuria, nausea or vomiting. AUA score is 8-19. He is not sexually active. Nothing aggravates the symptoms. Past treatments include finasteride and tamsulosin. The treatment provided significant relief. He has been using treatment for 2 or more years.     Review of Systems   Constitutional: Negative for activity change, appetite change, chills, diaphoresis, fatigue, fever and unexpected weight change.   HENT: Negative for congestion, hearing loss, sinus pressure and trouble swallowing.    Eyes: Negative for photophobia, pain, discharge and visual disturbance.   Respiratory: Negative for apnea, cough and shortness of breath.    Cardiovascular: Negative for chest pain, palpitations and leg swelling.   Gastrointestinal: Negative for abdominal distention, abdominal pain, anal bleeding, blood in stool, constipation, diarrhea, nausea, rectal pain and vomiting.   Endocrine: Negative for cold intolerance, heat intolerance, polydipsia, polyphagia and polyuria.   Genitourinary: Positive for  frequency, hesitancy, incomplete emptying (pt unsure) and nocturia. Negative for decreased urine volume, difficulty urinating, discharge, dysuria, enuresis, flank pain, genital sores, hematuria, penile pain, penile swelling, scrotal swelling, testicular pain and urgency.   Musculoskeletal: Negative for arthralgias, back pain and myalgias.   Skin: Negative for color change, pallor, rash and wound.   Allergic/Immunologic: Negative for environmental allergies, food allergies and immunocompromised state.   Neurological: Negative for dizziness, seizures, weakness and headaches.   Hematological: Negative for adenopathy. Does not bruise/bleed easily.   Psychiatric/Behavioral: Negative.        Objective:      Physical Exam   Nursing note and vitals reviewed.  Constitutional: He is oriented to person, place, and time. He appears well-developed and well-nourished.   HENT:   Head: Normocephalic.   Nose: Nose normal.   Mouth/Throat: Oropharynx is clear and moist.   Eyes: Conjunctivae and EOM are normal. Pupils are equal, round, and reactive to light.   Neck: Normal range of motion. Neck supple.   Cardiovascular: Normal rate, regular rhythm, normal heart sounds and intact distal pulses.    Pulmonary/Chest: Effort normal and breath sounds normal.   Abdominal: Soft. Bowel sounds are normal.   Genitourinary: Penis normal. Right testis shows no mass, no swelling and no tenderness. Right testis is descended. Cremasteric reflex is not absent on the right side. Left testis shows mass (Calcification oftail of epididymis) and tenderness. Left testis shows no swelling. Left testis is descended. Cremasteric reflex is not absent on the left side. Uncircumcised.   Musculoskeletal: Normal range of motion.   Neurological: He is alert and oriented to person, place, and time. He has normal reflexes.   Skin: Skin is warm and dry.     Psychiatric: He has a normal mood and affect. His behavior is normal. Judgment and thought content normal.        Assessment:       1. Benign non-nodular prostatic hyperplasia with lower urinary tract symptoms    2. Nocturia    3. Urinary frequency    4. Epididymal pain        Plan:       Patient Instructions   Continue flomax and Finasteride  Check PSA.  F/U 6 months

## 2017-10-20 ENCOUNTER — OUTSIDE PLACE OF SERVICE (OUTPATIENT)
Dept: ADMINISTRATIVE | Facility: OTHER | Age: 82
End: 2017-10-20
Payer: MEDICARE

## 2017-11-07 ENCOUNTER — TELEPHONE (OUTPATIENT)
Dept: FAMILY MEDICINE | Facility: CLINIC | Age: 82
End: 2017-11-07

## 2017-11-07 NOTE — TELEPHONE ENCOUNTER
Pt was at [facility] and  on 2017.  [phone] Ext [phone] - Victorino Florence.     I called [facility] to request the notes from the MD that pronounced him.  Had to leave a message.       The  home is looking for you to sign death certificate.     [facility] [phone]  Joshua     They will get an original to the [facility] for 2017.    Services are Friday 11/10/2017.    Please call Joshua back asap if there is any reason that you will not be able to sign it.

## 2017-11-08 ENCOUNTER — TELEPHONE (OUTPATIENT)
Dept: FAMILY MEDICINE | Facility: CLINIC | Age: 82
End: 2017-11-08

## 2017-11-08 NOTE — TELEPHONE ENCOUNTER
----- Message from Chayito Rowley sent at 11/8/2017 10:45 AM CST -----  Contact: Yvonne loyola/ [facility] [phone]   Want to know will Dr. Izquierdo be  signing  death certificate ?

## 2018-12-04 NOTE — H&P
[facility]  History & Physical    Patient Name: Eliecer Dickson  MRN: [id number]  Admission Date: 7/25/2017  Attending Physician: Matthew Ma MD  Primary Care Provider: To Trujillo MD         Patient information was obtained from patient, nursing home, past medical records and ER records.     Subjective:     Principal Problem:Acute on chronic diastolic congestive heart failure    Chief Complaint:   Chief Complaint   Patient presents with    Shortness of Breath     Nursing home reports that patient seemed SOB. On arrival, patient with no complaints and in no distress.         HPI: Eliecer Dickson is a 89 y.o. paranoid schizophrenia, coronary artery disease, severe mitral regurgitation, aortic stenosis, heart failure with preserved ejection fraction, right bundle branch block, hypothyroidism, Parkinson disease, history of left hip fracture status post repair, benign prostatic hyperplasia, hypothyroidism, osteoarthritis, and lactose intolerance.  He lives in [facility] in [place], [place].  He has a fluid restriction (dietary gives 1260 mL, nursing gives 240 mL).  He is full code.  He was in the [employer] and [place] II.  His primary care physician is Dr. To Izquierdo.    He was sent to [facility] ED on 7/25/17 after the nurse practitioner felt he was having a heart failure exacerbation.  He had bilateral pitting edema, cough, and orthopnea.  He typically takes furosemide 40 mg daily.  In the ED he was found to have hypoxia, tachypnea, relative hypotension (90s/40s), elevated troponin (0.359), evidence of heart failure exacerbation with elevated BNP (1621), prominent pulmonary vasculature and perihilar ground glass opacities on chest x-ray, and acute kidney injury (BUN 40, creatinine 3.1).  He was admitted to [facility].    Past Medical History:   Diagnosis Date    Arthritis      Asthma     BPH without obstruction/lower urinary tract symptoms     Choledocholithiasis with obstruction     Coronary artery disease     Depression     Diastolic CHF     Gait instability     GERD (gastroesophageal reflux disease)     Hypertension     Hypocalcemia     Melanoma     melanoma    Mitral regurgitation     Parkinson disease     Physical deconditioning     Pulmonary hypertension     Thyroid disease        Past Surgical History:   Procedure Laterality Date    CHOLECYSTECTOMY  unsure    HIP FRACTURE SURGERY Left     left broken hip repair    TONSILLECTOMY, ADENOIDECTOMY         Review of patient's allergies indicates:  No Known Allergies    No current facility-administered medications on file prior to encounter.      Current Outpatient Prescriptions on File Prior to Encounter   Medication Sig    alprazolam (XANAX) 0.25 MG tablet Take 0.25 mg by mouth 4 (four) times daily as needed.    aspirin 81 MG Chew Take 81 mg by mouth once daily.    carbidopa-levodopa  mg (SINEMET CR)  mg TbSR Take 1 tablet by mouth 3 (three) times daily.    clopidogrel (PLAVIX) 75 mg tablet Take 75 mg by mouth once daily.    D-METHORPHAN/PE/ACETAMINOPHEN (ROBITUSSIN COLD-FLU DAY ORAL) Take 30 mLs by mouth 2 (two) times daily as needed (cough).     docusate sodium (COLACE) 100 MG capsule Take 100 mg by mouth nightly as needed for Constipation.    donepezil (ARICEPT) 10 MG tablet Take 10 mg by mouth every evening.    FOLIC ACID/MULTIVITS-MIN/LUT (CENTRUM SILVER ORAL) Take by mouth once daily.     furosemide (LASIX) 40 MG tablet Take 1 tablet (40 mg total) by mouth 2 (two) times daily. (Patient taking differently: Take 40 mg by mouth once daily. )    lactase (LACTAID) 3,000 unit tablet Take 2 tablets by mouth 2 (two) times daily with meals.    levothyroxine (SYNTHROID) 125 MCG tablet Take 125 mcg by mouth once daily.    loratadine (CLARITIN) 10 mg tablet Take 10 mg by mouth once daily.     metoprolol tartrate (LOPRESSOR) 25 MG tablet Take 75 mg by mouth 2 (two) times daily. Take 3 tablets for a total of 75 mg twice a day.     potassium chloride SA (K-DUR,KLOR-CON) 20 MEQ tablet Take 20 mEq by mouth once daily.    quetiapine (SEROQUEL) 25 MG Tab Take 12.5 mg by mouth 2 (two) times daily.     quetiapine (SEROQUEL) 50 MG tablet Take 50 mg by mouth every evening.    ranitidine (ZANTAC) 150 MG tablet Take 150 mg by mouth 2 (two) times daily.    risperidone (RISPERDAL) 0.5 MG Tab Take 0.5 mg by mouth 2 (two) times daily.    senna-docusate 8.6-50 mg (SENNA WITH DOCUSATE SODIUM) 8.6-50 mg per tablet Take 2 tablets by mouth nightly as needed.    simvastatin (ZOCOR) 40 MG tablet Take 40 mg by mouth every evening.    tamsulosin (FLOMAX) 0.4 mg Cp24 Take 0.4 mg by mouth once daily.    tramadol (ULTRAM) 50 mg tablet Take 50 mg by mouth 2 (two) times daily.    [DISCONTINUED] carbidopa (LODOSYN) 25 mg tablet Take 25 mg by mouth 3 (three) times daily. Pt not taking, not on list    [DISCONTINUED] finasteride (PROSCAR) 5 mg tablet Take 5 mg by mouth once daily.    [DISCONTINUED] isosorbide mononitrate (IMDUR) 30 MG 24 hr tablet Take 1 tablet (30 mg total) by mouth once daily.    [DISCONTINUED] lorazepam (ATIVAN) 0.5 MG tablet Take 0.5 mg by mouth every 6 (six) hours as needed for Anxiety.    [DISCONTINUED] losartan (COZAAR) 50 MG tablet Take 1 tablet (50 mg total) by mouth once daily.     Family History     None        Social History Main Topics    Smoking status: Never Smoker    Smokeless tobacco: Never Used    Alcohol use No    Drug use: No    Sexual activity: No     Review of Systems   Constitutional: Negative for chills and fever.   HENT: Negative for trouble swallowing and voice change.    Eyes: Negative for pain and redness.   Respiratory: Positive for cough and shortness of breath.    Cardiovascular: Positive for leg swelling. Negative for chest pain.   Gastrointestinal: Negative for nausea  and vomiting.   Genitourinary: Negative for difficulty urinating and dysuria.   Musculoskeletal: Positive for gait problem. Negative for neck pain and neck stiffness.   Skin: Negative for color change and rash.   Neurological: Negative for speech difficulty and headaches.     Objective:     Vital Signs (Most Recent):  Temp: 96.8 °F (36 °C) (07/25/17 1155)  Pulse: 80 (07/25/17 1503)  Resp: 20 (07/25/17 1503)  BP: (!) 108/52 (07/25/17 1449)  SpO2: 100 % (07/25/17 1503) Vital Signs (24h Range):  Temp:  [96.8 °F (36 °C)] 96.8 °F (36 °C)  Pulse:  [73-81] 80  Resp:  [12-25] 20  SpO2:  [91 %-100 %] 100 %  BP: ()/(43-52) 108/52     Weight: 81.6 kg (180 lb)  Body mass index is 29.05 kg/m².    Physical Exam   Constitutional: He is oriented to person, place, and time. He appears well-developed. No distress.   HENT:   Head: Normocephalic.   Mouth/Throat: Oropharynx is clear and moist.   Eyes: Conjunctivae are normal. Pupils are equal, round, and reactive to light.   Neck: Neck supple. No tracheal deviation present.   Cardiovascular: Normal rate and regular rhythm.    Murmur heard.   Systolic murmur is present   Pulmonary/Chest: No respiratory distress. He has rales.   Abdominal: Soft. He exhibits no distension. There is no tenderness.   Musculoskeletal: He exhibits edema.   Legs wrapped   Neurological: He is alert and oriented to person, place, and time.   Skin: Skin is warm and dry.   Psychiatric: He has a normal mood and affect.   Nursing note and vitals reviewed.       Significant Labs: All pertinent labs within the past 24 hours have been reviewed.    Significant Imaging: I have reviewed all pertinent imaging results/findings within the past 24 hours.   EKG 7/25/17: Normal sinus rhythm, right bundle branch block  X-Ray Chest AP Portable 7/25/17: The mediastinal structures are midline.  The cardiac silhouette is difficult to evaluate due to AP technique.  There is atherosclerotic calcification of the aortic arch.   Ossification projecting over the medial right lung apex are stable and correspond to thyroid calcifications seen on prior CT.  There is prominence of central pulmonary vessels and perihilar groundglass opacities suggestive of edema.  There is osteopenia and degenerative change of the shoulders.  US Lower Extremity Veins Bilateral 7/25/17: The deep veins of the bilateral lower extremity demonstrate normal color flow and compressibility.  No fluid collections identified.  The common femoral veins demonstrates normal phasicity of waveform.  No evidence of significant venous reflux.    Assessment/Plan:     * Acute on chronic diastolic congestive heart failure    Acute hypoxemic respiratory failure  Severe mitral regurgitation  Pulmonary hypertension  Takes furosemide 40 mg daily at home.  Give furosemide 80 mg IV BID.  Hold losartan 50 mg daily.  Monitor intake and output.  Monitor renal function and electrolytes.  Follow up echocardiogram results.  Wean oxygen as tolerated.            NSTEMI (non-ST elevated myocardial infarction)    May be due to heart failure exacerbation.  Trend troponin.          Acute kidney injury    Hold losartan.  Get renal ultrasound.  Monitor.          Benign non-nodular prostatic hyperplasia with lower urinary tract symptoms    Continue tamsulosin 0.4 mg daily, finasteride 5 mg daily.          Coronary artery disease involving native coronary artery of native heart    Continue aspirin 81 mg daily, clopidrogel 75 mg daily, simvastatin 40 mg HS.  Hold isosorbide mononitrate 30 mg daily.          Paranoid schizophrenia, chronic condition    Continue quetiapine 12.5 mg BID and 50 mg nightly, risperidone 0.5 mg BID, lorazepam 0.5 mg q6h PRN.  Will hold quetiapine if hypotension continues to be an issue.          Acquired hypothyroidism    Continue levothyroxine 125 mcg daily.          Parkinson disease    Continue carbidopa-levodopa  mg TID.  Fall precautions.            VTE Risk  Mitigation     None        Matthew Ma MD  Department of Hospital Medicine   [facility]   Anibal Ornelas (MD), Critical Care Medicine  [address] [place] [address]  Phone: [phone]  Fax: [phone] Anibal Ornelas (MD), Critical Care Medicine  [address] [address]  Phone: [phone]  Fax: [phone]    bree Burton  Phone: (   )    -  Fax: (   )    -

## 2023-08-14 NOTE — PATIENT INSTRUCTIONS
Check Free and total PSA now.  If elevated will repeat in 6 months.  If PSA stays stable may be normal for this patient based on patients age and size of prostate.   Attending Attestation (For Attendings USE Only)...